# Patient Record
Sex: MALE | Race: WHITE | NOT HISPANIC OR LATINO | Employment: OTHER | URBAN - METROPOLITAN AREA
[De-identification: names, ages, dates, MRNs, and addresses within clinical notes are randomized per-mention and may not be internally consistent; named-entity substitution may affect disease eponyms.]

---

## 2017-07-14 ENCOUNTER — AMBULATORY - HEALTHEAST (OUTPATIENT)
Dept: MULTI SPECIALTY CLINIC | Facility: CLINIC | Age: 62
End: 2017-07-14

## 2017-10-11 ENCOUNTER — RECORDS - HEALTHEAST (OUTPATIENT)
Dept: ADMINISTRATIVE | Facility: OTHER | Age: 62
End: 2017-10-11

## 2019-07-22 ENCOUNTER — RECORDS - HEALTHEAST (OUTPATIENT)
Dept: ADMINISTRATIVE | Facility: OTHER | Age: 64
End: 2019-07-22

## 2019-07-22 LAB — RETINOPATHY: POSITIVE

## 2019-07-26 ENCOUNTER — COMMUNICATION - HEALTHEAST (OUTPATIENT)
Dept: FAMILY MEDICINE | Facility: CLINIC | Age: 64
End: 2019-07-26

## 2019-08-16 ENCOUNTER — OFFICE VISIT - HEALTHEAST (OUTPATIENT)
Dept: FAMILY MEDICINE | Facility: CLINIC | Age: 64
End: 2019-08-16

## 2019-08-16 ENCOUNTER — COMMUNICATION - HEALTHEAST (OUTPATIENT)
Dept: TELEHEALTH | Facility: CLINIC | Age: 64
End: 2019-08-16

## 2019-08-16 DIAGNOSIS — E11.8 TYPE 2 DIABETES MELLITUS WITH COMPLICATION, WITH LONG-TERM CURRENT USE OF INSULIN (H): ICD-10-CM

## 2019-08-16 DIAGNOSIS — E55.9 VITAMIN D DEFICIENCY: ICD-10-CM

## 2019-08-16 DIAGNOSIS — D63.8 ANEMIA OF CHRONIC DISEASE: ICD-10-CM

## 2019-08-16 DIAGNOSIS — Z80.42 FAMILY HISTORY OF PROSTATE CANCER: ICD-10-CM

## 2019-08-16 DIAGNOSIS — Z79.4 TYPE 2 DIABETES MELLITUS WITH COMPLICATION, WITH LONG-TERM CURRENT USE OF INSULIN (H): ICD-10-CM

## 2019-08-16 DIAGNOSIS — E78.5 HYPERLIPIDEMIA WITH TARGET LDL LESS THAN 100: ICD-10-CM

## 2019-08-16 DIAGNOSIS — D72.821 MONOCYTOSIS: ICD-10-CM

## 2019-08-16 DIAGNOSIS — Z00.00 ROUTINE GENERAL MEDICAL EXAMINATION AT A HEALTH CARE FACILITY: ICD-10-CM

## 2019-08-16 DIAGNOSIS — R80.9 MICROALBUMINURIA: ICD-10-CM

## 2019-08-16 DIAGNOSIS — E11.42 DIABETIC POLYNEUROPATHY ASSOCIATED WITH TYPE 2 DIABETES MELLITUS (H): ICD-10-CM

## 2019-08-16 DIAGNOSIS — I10 HTN (HYPERTENSION), BENIGN: ICD-10-CM

## 2019-08-16 LAB
HIV 1+2 AB+HIV1 P24 AG SERPL QL IA: NEGATIVE
PSA SERPL-MCNC: 2.1 NG/ML (ref 0–4.5)

## 2019-08-16 RX ORDER — B-COMPLEX WITH VITAMIN C
1 TABLET ORAL DAILY
Status: SHIPPED | COMMUNITY
Start: 2019-08-16

## 2019-08-16 ASSESSMENT — MIFFLIN-ST. JEOR: SCORE: 2141.63

## 2019-08-16 NOTE — ASSESSMENT & PLAN NOTE
Most recent labs were in June 2019 and show good control of his cholesterol.  Liver panel also normal at that time.  Continue atorvastatin 10 mg daily.

## 2019-08-16 NOTE — ASSESSMENT & PLAN NOTE
The patient had previous work-up by hematology and reactive monocytosis was suspected.  He had a CBC in June 2018 which was normal.  We will repeat testing of light chains today.  If increasing, reevaluation by hematology.

## 2019-08-16 NOTE — ASSESSMENT & PLAN NOTE
The patient reports good control at home.  Systolic is slightly elevated today but he did not take his medications yet.  Continue amlodipine 10 mg daily, metoprolol 50 mg daily and valsartan thiazide 320/25 mg daily.

## 2019-08-16 NOTE — ASSESSMENT & PLAN NOTE
Most recent A1c was 9.4 in June 2019.  The patient unfortunately does not have his sugar log with him today but reports checking 4 times a day.  He also reports excellent compliance with his medications.  Increase Lantus to 30 units each morning and 40 units at bedtime.  Continue current dosing of lispro.  I have asked him to bring his log to his next appointment in 1 month.  We will plan to check an A1c at that time as well.

## 2019-08-19 ENCOUNTER — COMMUNICATION - HEALTHEAST (OUTPATIENT)
Dept: FAMILY MEDICINE | Facility: CLINIC | Age: 64
End: 2019-08-19

## 2019-08-19 DIAGNOSIS — D72.821 MONOCYTOSIS: ICD-10-CM

## 2019-08-19 LAB
25(OH)D3 SERPL-MCNC: 32.3 NG/ML (ref 30–80)
KAPPA LC FREE SER-MCNC: 2.71 MG/DL (ref 0.33–1.94)
KAPPA LC FREE/LAMBDA FREE SER NEPH: 1.32 {RATIO} (ref 0.26–1.65)
LAMBDA LC FREE SERPL-MCNC: 2.06 MG/DL (ref 0.57–2.63)

## 2019-08-22 ENCOUNTER — COMMUNICATION - HEALTHEAST (OUTPATIENT)
Dept: FAMILY MEDICINE | Facility: CLINIC | Age: 64
End: 2019-08-22

## 2019-08-22 DIAGNOSIS — E11.8 TYPE 2 DIABETES MELLITUS WITH COMPLICATION, WITH LONG-TERM CURRENT USE OF INSULIN (H): ICD-10-CM

## 2019-08-22 DIAGNOSIS — Z79.4 TYPE 2 DIABETES MELLITUS WITH COMPLICATION, WITH LONG-TERM CURRENT USE OF INSULIN (H): ICD-10-CM

## 2019-08-27 ENCOUNTER — COMMUNICATION - HEALTHEAST (OUTPATIENT)
Dept: ADMINISTRATIVE | Facility: HOSPITAL | Age: 64
End: 2019-08-27

## 2019-08-29 ENCOUNTER — COMMUNICATION - HEALTHEAST (OUTPATIENT)
Dept: ONCOLOGY | Facility: HOSPITAL | Age: 64
End: 2019-08-29

## 2019-09-19 ENCOUNTER — OFFICE VISIT - HEALTHEAST (OUTPATIENT)
Dept: ONCOLOGY | Facility: HOSPITAL | Age: 64
End: 2019-09-19

## 2019-09-19 DIAGNOSIS — D72.821 MONOCYTOSIS: ICD-10-CM

## 2019-09-19 ASSESSMENT — MIFFLIN-ST. JEOR: SCORE: 2161.14

## 2019-09-27 ENCOUNTER — OFFICE VISIT - HEALTHEAST (OUTPATIENT)
Dept: FAMILY MEDICINE | Facility: CLINIC | Age: 64
End: 2019-09-27

## 2019-09-27 DIAGNOSIS — E11.8 TYPE 2 DIABETES MELLITUS WITH COMPLICATION, WITH LONG-TERM CURRENT USE OF INSULIN (H): ICD-10-CM

## 2019-09-27 DIAGNOSIS — Z79.4 TYPE 2 DIABETES MELLITUS WITH COMPLICATION, WITH LONG-TERM CURRENT USE OF INSULIN (H): ICD-10-CM

## 2019-09-27 LAB — HBA1C MFR BLD: 9.6 % (ref 3.5–6)

## 2019-09-27 NOTE — ASSESSMENT & PLAN NOTE
Not under adequate control.  Increase Lantus to 30 units in the morning and 50 units at bedtime.  Continue mealtime insulin with 1 unit per 3 g of carbohydrates plus correction for blood sugar at each meal.  Continue metformin.  I am recommending that the patient see the [facility] pharmacist to discuss addition of other medications.  Referral placed.  He was also asked to contact me in 2 weeks via my chart with his blood sugar so we can adjust his insulin.

## 2019-10-04 ENCOUNTER — OFFICE VISIT - HEALTHEAST (OUTPATIENT)
Dept: PHARMACY | Facility: CLINIC | Age: 64
End: 2019-10-04

## 2019-10-04 DIAGNOSIS — E11.8 TYPE 2 DIABETES MELLITUS WITH COMPLICATION, WITH LONG-TERM CURRENT USE OF INSULIN (H): ICD-10-CM

## 2019-10-04 DIAGNOSIS — I10 HTN (HYPERTENSION), BENIGN: ICD-10-CM

## 2019-10-04 DIAGNOSIS — Z79.4 TYPE 2 DIABETES MELLITUS WITH COMPLICATION, WITH LONG-TERM CURRENT USE OF INSULIN (H): ICD-10-CM

## 2019-10-04 DIAGNOSIS — E55.9 VITAMIN D DEFICIENCY: ICD-10-CM

## 2019-10-04 DIAGNOSIS — E78.5 HYPERLIPIDEMIA WITH TARGET LDL LESS THAN 100: ICD-10-CM

## 2019-10-25 ENCOUNTER — COMMUNICATION - HEALTHEAST (OUTPATIENT)
Dept: NURSING | Facility: CLINIC | Age: 64
End: 2019-10-25

## 2019-11-12 ENCOUNTER — COMMUNICATION - HEALTHEAST (OUTPATIENT)
Dept: FAMILY MEDICINE | Facility: CLINIC | Age: 64
End: 2019-11-12

## 2019-11-15 ENCOUNTER — OFFICE VISIT - HEALTHEAST (OUTPATIENT)
Dept: FAMILY MEDICINE | Facility: CLINIC | Age: 64
End: 2019-11-15

## 2019-11-15 DIAGNOSIS — I10 HTN (HYPERTENSION), BENIGN: ICD-10-CM

## 2019-11-15 DIAGNOSIS — E11.8 TYPE 2 DIABETES MELLITUS WITH COMPLICATION, WITH LONG-TERM CURRENT USE OF INSULIN (H): ICD-10-CM

## 2019-11-15 DIAGNOSIS — Z79.4 TYPE 2 DIABETES MELLITUS WITH COMPLICATION, WITH LONG-TERM CURRENT USE OF INSULIN (H): ICD-10-CM

## 2019-11-15 LAB
ANION GAP SERPL CALCULATED.3IONS-SCNC: 12 MMOL/L (ref 5–18)
BUN SERPL-MCNC: 17 MG/DL (ref 8–22)
CALCIUM SERPL-MCNC: 9.4 MG/DL (ref 8.5–10.5)
CHLORIDE BLD-SCNC: 103 MMOL/L (ref 98–107)
CO2 SERPL-SCNC: 26 MMOL/L (ref 22–31)
CREAT SERPL-MCNC: 1.02 MG/DL (ref 0.7–1.3)
GFR SERPL CREATININE-BSD FRML MDRD: >60 ML/MIN/1.73M2
GLUCOSE BLD-MCNC: 151 MG/DL (ref 70–125)
POTASSIUM BLD-SCNC: 4.5 MMOL/L (ref 3.5–5)
SODIUM SERPL-SCNC: 141 MMOL/L (ref 136–145)

## 2019-11-15 NOTE — ASSESSMENT & PLAN NOTE
The patient started on jardiance a couple of weeks ago and that is going well. Blood sugars are improving on the new medication. He hashad intermittent low blood sugars before his evening meal so he is elliminating lunch time insulin if BS is not high and he is having limited carbs. No other change in his medication at this time. BMP today. Follow up with A1C and office visit in about 6 weeks.

## 2019-11-19 ENCOUNTER — COMMUNICATION - HEALTHEAST (OUTPATIENT)
Dept: FAMILY MEDICINE | Facility: CLINIC | Age: 64
End: 2019-11-19

## 2019-11-19 DIAGNOSIS — Z79.4 TYPE 2 DIABETES MELLITUS WITH COMPLICATION, WITH LONG-TERM CURRENT USE OF INSULIN (H): ICD-10-CM

## 2019-11-19 DIAGNOSIS — E11.8 TYPE 2 DIABETES MELLITUS WITH COMPLICATION, WITH LONG-TERM CURRENT USE OF INSULIN (H): ICD-10-CM

## 2019-11-26 ENCOUNTER — COMMUNICATION - HEALTHEAST (OUTPATIENT)
Dept: FAMILY MEDICINE | Facility: CLINIC | Age: 64
End: 2019-11-26

## 2019-11-26 DIAGNOSIS — Z79.4 TYPE 2 DIABETES MELLITUS WITH COMPLICATION, WITH LONG-TERM CURRENT USE OF INSULIN (H): ICD-10-CM

## 2019-11-26 DIAGNOSIS — E11.8 TYPE 2 DIABETES MELLITUS WITH COMPLICATION, WITH LONG-TERM CURRENT USE OF INSULIN (H): ICD-10-CM

## 2019-12-03 ENCOUNTER — RECORDS - HEALTHEAST (OUTPATIENT)
Dept: HEALTH INFORMATION MANAGEMENT | Facility: CLINIC | Age: 64
End: 2019-12-03

## 2019-12-27 ENCOUNTER — OFFICE VISIT - HEALTHEAST (OUTPATIENT)
Dept: FAMILY MEDICINE | Facility: CLINIC | Age: 64
End: 2019-12-27

## 2019-12-27 DIAGNOSIS — E11.8 TYPE 2 DIABETES MELLITUS WITH COMPLICATION, WITH LONG-TERM CURRENT USE OF INSULIN (H): ICD-10-CM

## 2019-12-27 DIAGNOSIS — Z23 NEED FOR VACCINATION: ICD-10-CM

## 2019-12-27 DIAGNOSIS — E78.5 HYPERLIPIDEMIA WITH TARGET LDL LESS THAN 100: ICD-10-CM

## 2019-12-27 DIAGNOSIS — Z79.4 TYPE 2 DIABETES MELLITUS WITH COMPLICATION, WITH LONG-TERM CURRENT USE OF INSULIN (H): ICD-10-CM

## 2019-12-27 DIAGNOSIS — I10 HTN (HYPERTENSION), BENIGN: ICD-10-CM

## 2019-12-27 LAB
ALBUMIN SERPL-MCNC: 4.1 G/DL (ref 3.5–5)
ANION GAP SERPL CALCULATED.3IONS-SCNC: 12 MMOL/L (ref 5–18)
BUN SERPL-MCNC: 15 MG/DL (ref 8–22)
CALCIUM SERPL-MCNC: 10.5 MG/DL (ref 8.5–10.5)
CHLORIDE BLD-SCNC: 99 MMOL/L (ref 98–107)
CHOLEST SERPL-MCNC: 163 MG/DL
CO2 SERPL-SCNC: 29 MMOL/L (ref 22–31)
CREAT SERPL-MCNC: 1.09 MG/DL (ref 0.7–1.3)
FASTING STATUS PATIENT QL REPORTED: YES
GFR SERPL CREATININE-BSD FRML MDRD: >60 ML/MIN/1.73M2
GLUCOSE BLD-MCNC: 180 MG/DL (ref 70–125)
HBA1C MFR BLD: 8.2 % (ref 3.5–6)
HDLC SERPL-MCNC: 43 MG/DL
LDLC SERPL CALC-MCNC: 84 MG/DL
PHOSPHATE SERPL-MCNC: 4.3 MG/DL (ref 2.5–4.5)
POTASSIUM BLD-SCNC: 4.4 MMOL/L (ref 3.5–5)
SODIUM SERPL-SCNC: 140 MMOL/L (ref 136–145)
TRIGL SERPL-MCNC: 181 MG/DL

## 2019-12-27 NOTE — ASSESSMENT & PLAN NOTE
Well controlled. Renal profile today. Continue amlodipine 10 mg daily, Metoprolol 50 mg daily, valasartan 320/25 mg daily.

## 2019-12-27 NOTE — ASSESSMENT & PLAN NOTE
A1C still elevated at 8.2 but improved from 9.6 at previous visit. Since it is primarily his fasting sugars that are high, we will increase his Lantus to 60 units at bedtime and 30 units in the morning. Continue other medications unchanged: Metformin 1000 two times a day, empagliflozin 10 mg daily, and sliding scale Lispro 1 unit per carb plus correction. Recheck in 3 months.

## 2020-03-17 ENCOUNTER — COMMUNICATION - HEALTHEAST (OUTPATIENT)
Dept: FAMILY MEDICINE | Facility: CLINIC | Age: 65
End: 2020-03-17

## 2020-05-10 ENCOUNTER — COMMUNICATION - HEALTHEAST (OUTPATIENT)
Dept: FAMILY MEDICINE | Facility: CLINIC | Age: 65
End: 2020-05-10

## 2020-05-10 DIAGNOSIS — E11.8 TYPE 2 DIABETES MELLITUS WITH COMPLICATION, WITH LONG-TERM CURRENT USE OF INSULIN (H): ICD-10-CM

## 2020-05-10 DIAGNOSIS — Z79.4 TYPE 2 DIABETES MELLITUS WITH COMPLICATION, WITH LONG-TERM CURRENT USE OF INSULIN (H): ICD-10-CM

## 2020-05-18 ENCOUNTER — COMMUNICATION - HEALTHEAST (OUTPATIENT)
Dept: FAMILY MEDICINE | Facility: CLINIC | Age: 65
End: 2020-05-18

## 2020-05-18 DIAGNOSIS — Z79.4 TYPE 2 DIABETES MELLITUS WITH COMPLICATION, WITH LONG-TERM CURRENT USE OF INSULIN (H): ICD-10-CM

## 2020-05-18 DIAGNOSIS — E11.8 TYPE 2 DIABETES MELLITUS WITH COMPLICATION, WITH LONG-TERM CURRENT USE OF INSULIN (H): ICD-10-CM

## 2020-05-27 ENCOUNTER — COMMUNICATION - HEALTHEAST (OUTPATIENT)
Dept: FAMILY MEDICINE | Facility: CLINIC | Age: 65
End: 2020-05-27

## 2020-05-27 DIAGNOSIS — E11.8 TYPE 2 DIABETES MELLITUS WITH COMPLICATION, WITH LONG-TERM CURRENT USE OF INSULIN (H): ICD-10-CM

## 2020-05-27 DIAGNOSIS — Z79.4 TYPE 2 DIABETES MELLITUS WITH COMPLICATION, WITH LONG-TERM CURRENT USE OF INSULIN (H): ICD-10-CM

## 2020-06-26 ENCOUNTER — COMMUNICATION - HEALTHEAST (OUTPATIENT)
Dept: FAMILY MEDICINE | Facility: CLINIC | Age: 65
End: 2020-06-26

## 2020-07-10 ENCOUNTER — OFFICE VISIT - HEALTHEAST (OUTPATIENT)
Dept: FAMILY MEDICINE | Facility: CLINIC | Age: 65
End: 2020-07-10

## 2020-07-10 DIAGNOSIS — I10 ESSENTIAL HYPERTENSION: ICD-10-CM

## 2020-07-10 DIAGNOSIS — Z79.4 TYPE 2 DIABETES MELLITUS WITH COMPLICATION, WITH LONG-TERM CURRENT USE OF INSULIN (H): ICD-10-CM

## 2020-07-10 DIAGNOSIS — E11.8 TYPE 2 DIABETES MELLITUS WITH COMPLICATION, WITH LONG-TERM CURRENT USE OF INSULIN (H): ICD-10-CM

## 2020-07-10 DIAGNOSIS — E11.42 DIABETIC POLYNEUROPATHY ASSOCIATED WITH TYPE 2 DIABETES MELLITUS (H): ICD-10-CM

## 2020-07-10 DIAGNOSIS — L82.1 SEBORRHEIC KERATOSIS: ICD-10-CM

## 2020-07-10 DIAGNOSIS — E78.5 HYPERLIPIDEMIA WITH TARGET LDL LESS THAN 100: ICD-10-CM

## 2020-07-10 LAB
ANION GAP SERPL CALCULATED.3IONS-SCNC: 10 MMOL/L (ref 5–18)
BUN SERPL-MCNC: 16 MG/DL (ref 8–22)
CALCIUM SERPL-MCNC: 9.5 MG/DL (ref 8.5–10.5)
CHLORIDE BLD-SCNC: 102 MMOL/L (ref 98–107)
CO2 SERPL-SCNC: 28 MMOL/L (ref 22–31)
CREAT SERPL-MCNC: 1.05 MG/DL (ref 0.7–1.3)
CREAT UR-MCNC: 89.6 MG/DL
GFR SERPL CREATININE-BSD FRML MDRD: >60 ML/MIN/1.73M2
GLUCOSE BLD-MCNC: 130 MG/DL (ref 70–125)
HBA1C MFR BLD: 7.7 % (ref 3.5–6)
MICROALBUMIN UR-MCNC: 10.83 MG/DL (ref 0–1.99)
MICROALBUMIN/CREAT UR: 120.9 MG/G
POTASSIUM BLD-SCNC: 4.1 MMOL/L (ref 3.5–5)
SODIUM SERPL-SCNC: 140 MMOL/L (ref 136–145)
VIT B12 SERPL-MCNC: >2000 PG/ML (ref 213–816)

## 2020-07-10 ASSESSMENT — MIFFLIN-ST. JEOR: SCORE: 2175.3

## 2020-07-10 NOTE — ASSESSMENT & PLAN NOTE
>>ASSESSMENT AND PLAN FOR DIABETIC POLYNEUROPATHY ASSOCIATED WITH TYPE 2 DIABETES MELLITUS (H) WRITTEN ON 7/10/2020  8:44 AM BY FREEDOM LEYVA    Patient not having any pain at this time. No skin breakdown. He will continue with proper skin care. We will continue to aim for good glucose control.    >>ASSESSMENT AND PLAN FOR TYPE 2 DIABETES MELLITUS WITH COMPLICATION, WITH LONG-TERM CURRENT USE OF INSULIN (H) WRITTEN ON 7/10/2020  8:45 AM BY FREEDOM LEYVA    A1C is 7.7 today which is a significant improvement from previous numbers. Artem is happy with this level of control and I am in agreement with him. Continue current management. Recheck in 6 months.

## 2020-07-10 NOTE — ASSESSMENT & PLAN NOTE
A1C is 7.7 today which is a significant improvement from previous numbers. Artem is happy with this level of control and I am in agreement with him. Continue current management. Recheck in 6 months.

## 2020-07-10 NOTE — ASSESSMENT & PLAN NOTE
Well controlled. Continue amlodipine 10 mg, metoprolol 50 mg, and valasartan-hctz 320-25 daily. BMP today. Recheck in 6 months.

## 2020-07-16 ENCOUNTER — COMMUNICATION - HEALTHEAST (OUTPATIENT)
Dept: FAMILY MEDICINE | Facility: CLINIC | Age: 65
End: 2020-07-16

## 2020-07-16 DIAGNOSIS — Z79.4 TYPE 2 DIABETES MELLITUS WITH COMPLICATION, WITH LONG-TERM CURRENT USE OF INSULIN (H): ICD-10-CM

## 2020-07-16 DIAGNOSIS — E11.8 TYPE 2 DIABETES MELLITUS WITH COMPLICATION, WITH LONG-TERM CURRENT USE OF INSULIN (H): ICD-10-CM

## 2020-09-07 ENCOUNTER — COMMUNICATION - HEALTHEAST (OUTPATIENT)
Dept: FAMILY MEDICINE | Facility: CLINIC | Age: 65
End: 2020-09-07

## 2020-09-07 DIAGNOSIS — E11.8 TYPE 2 DIABETES MELLITUS WITH COMPLICATION, WITH LONG-TERM CURRENT USE OF INSULIN (H): ICD-10-CM

## 2020-09-07 DIAGNOSIS — Z79.4 TYPE 2 DIABETES MELLITUS WITH COMPLICATION, WITH LONG-TERM CURRENT USE OF INSULIN (H): ICD-10-CM

## 2020-09-08 ENCOUNTER — COMMUNICATION - HEALTHEAST (OUTPATIENT)
Dept: FAMILY MEDICINE | Facility: CLINIC | Age: 65
End: 2020-09-08

## 2020-11-23 ENCOUNTER — COMMUNICATION - HEALTHEAST (OUTPATIENT)
Dept: FAMILY MEDICINE | Facility: CLINIC | Age: 65
End: 2020-11-23

## 2020-11-23 DIAGNOSIS — Z79.4 TYPE 2 DIABETES MELLITUS WITH COMPLICATION, WITH LONG-TERM CURRENT USE OF INSULIN (H): ICD-10-CM

## 2020-11-23 DIAGNOSIS — E11.8 TYPE 2 DIABETES MELLITUS WITH COMPLICATION, WITH LONG-TERM CURRENT USE OF INSULIN (H): ICD-10-CM

## 2020-11-24 RX ORDER — ASPIRIN 81 MG/1
TABLET, CHEWABLE ORAL
Qty: 90 TABLET | Refills: 1 | Status: SHIPPED | OUTPATIENT
Start: 2020-11-24 | End: 2022-03-01

## 2020-12-18 ENCOUNTER — OFFICE VISIT - HEALTHEAST (OUTPATIENT)
Dept: FAMILY MEDICINE | Facility: CLINIC | Age: 65
End: 2020-12-18

## 2020-12-18 DIAGNOSIS — E78.5 HYPERLIPIDEMIA WITH TARGET LDL LESS THAN 100: ICD-10-CM

## 2020-12-18 DIAGNOSIS — Z79.4 TYPE 2 DIABETES MELLITUS WITH COMPLICATION, WITH LONG-TERM CURRENT USE OF INSULIN (H): ICD-10-CM

## 2020-12-18 DIAGNOSIS — E11.8 TYPE 2 DIABETES MELLITUS WITH COMPLICATION, WITH LONG-TERM CURRENT USE OF INSULIN (H): ICD-10-CM

## 2020-12-18 DIAGNOSIS — Z12.5 SCREENING PSA (PROSTATE SPECIFIC ANTIGEN): ICD-10-CM

## 2020-12-18 DIAGNOSIS — I10 ESSENTIAL HYPERTENSION: ICD-10-CM

## 2020-12-18 LAB
ALBUMIN SERPL-MCNC: 3.9 G/DL (ref 3.5–5)
ALP SERPL-CCNC: 101 U/L (ref 45–120)
ALT SERPL W P-5'-P-CCNC: 49 U/L (ref 0–45)
ANION GAP SERPL CALCULATED.3IONS-SCNC: 21 MMOL/L (ref 5–18)
AST SERPL W P-5'-P-CCNC: 30 U/L (ref 0–40)
BILIRUB SERPL-MCNC: 0.3 MG/DL (ref 0–1)
BUN SERPL-MCNC: 17 MG/DL (ref 8–22)
CALCIUM SERPL-MCNC: 9.5 MG/DL (ref 8.5–10.5)
CHLORIDE BLD-SCNC: 104 MMOL/L (ref 98–107)
CHOLEST SERPL-MCNC: 154 MG/DL
CO2 SERPL-SCNC: 18 MMOL/L (ref 22–31)
CREAT SERPL-MCNC: 0.98 MG/DL (ref 0.7–1.3)
FASTING STATUS PATIENT QL REPORTED: YES
GFR SERPL CREATININE-BSD FRML MDRD: >60 ML/MIN/1.73M2
GLUCOSE BLD-MCNC: 144 MG/DL (ref 70–125)
HBA1C MFR BLD: 7.8 %
HDLC SERPL-MCNC: 43 MG/DL
LDLC SERPL CALC-MCNC: 83 MG/DL
POTASSIUM BLD-SCNC: 4.3 MMOL/L (ref 3.5–5)
PROT SERPL-MCNC: 7.5 G/DL (ref 6–8)
PSA SERPL-MCNC: 0.7 NG/ML (ref 0–4.5)
SODIUM SERPL-SCNC: 143 MMOL/L (ref 136–145)
TRIGL SERPL-MCNC: 138 MG/DL

## 2020-12-18 ASSESSMENT — MIFFLIN-ST. JEOR: SCORE: 2142.81

## 2020-12-18 NOTE — ASSESSMENT & PLAN NOTE
Fairly good control with an A1C of 7.8. We discussed an increase in insulin but the patient would prefer to work on adding in some exercise. Continue current dosing of medications. Recheck in 3 months.

## 2021-02-03 ENCOUNTER — COMMUNICATION - HEALTHEAST (OUTPATIENT)
Dept: FAMILY MEDICINE | Facility: CLINIC | Age: 66
End: 2021-02-03

## 2021-02-04 ENCOUNTER — COMMUNICATION - HEALTHEAST (OUTPATIENT)
Dept: FAMILY MEDICINE | Facility: CLINIC | Age: 66
End: 2021-02-04

## 2021-04-02 ENCOUNTER — OFFICE VISIT - HEALTHEAST (OUTPATIENT)
Dept: FAMILY MEDICINE | Facility: CLINIC | Age: 66
End: 2021-04-02

## 2021-04-02 DIAGNOSIS — E11.8 TYPE 2 DIABETES MELLITUS WITH COMPLICATION, WITH LONG-TERM CURRENT USE OF INSULIN (H): ICD-10-CM

## 2021-04-02 DIAGNOSIS — E11.42 DIABETIC POLYNEUROPATHY ASSOCIATED WITH TYPE 2 DIABETES MELLITUS (H): ICD-10-CM

## 2021-04-02 DIAGNOSIS — Z79.4 TYPE 2 DIABETES MELLITUS WITH COMPLICATION, WITH LONG-TERM CURRENT USE OF INSULIN (H): ICD-10-CM

## 2021-04-02 DIAGNOSIS — E66.01 MORBID OBESITY (H): ICD-10-CM

## 2021-04-02 DIAGNOSIS — E78.5 HYPERLIPIDEMIA WITH TARGET LDL LESS THAN 100: ICD-10-CM

## 2021-04-02 DIAGNOSIS — Z00.00 ROUTINE GENERAL MEDICAL EXAMINATION AT A HEALTH CARE FACILITY: ICD-10-CM

## 2021-04-02 DIAGNOSIS — I10 ESSENTIAL HYPERTENSION: ICD-10-CM

## 2021-04-02 DIAGNOSIS — L98.9 SKIN LESION: ICD-10-CM

## 2021-04-02 LAB — HBA1C MFR BLD: 7.2 %

## 2021-04-02 RX ORDER — METOPROLOL SUCCINATE 50 MG/1
50 TABLET, EXTENDED RELEASE ORAL DAILY
Qty: 90 TABLET | Refills: 3 | Status: SHIPPED | OUTPATIENT
Start: 2021-04-02 | End: 2022-01-20

## 2021-04-02 RX ORDER — FLURBIPROFEN SODIUM 0.3 MG/ML
SOLUTION/ DROPS OPHTHALMIC
Qty: 450 EACH | Refills: 3 | Status: SHIPPED | OUTPATIENT
Start: 2021-04-02 | End: 2022-03-01

## 2021-04-02 RX ORDER — LANCETS 30 GAUGE
EACH MISCELLANEOUS
Qty: 300 EACH | Refills: 3 | Status: SHIPPED | OUTPATIENT
Start: 2021-04-02 | End: 2024-01-31

## 2021-04-02 RX ORDER — EMPAGLIFLOZIN 10 MG/1
10 TABLET, FILM COATED ORAL DAILY
Qty: 90 TABLET | Refills: 3 | Status: SHIPPED | OUTPATIENT
Start: 2021-04-02 | End: 2022-01-20

## 2021-04-02 RX ORDER — AMLODIPINE BESYLATE 10 MG/1
10 TABLET ORAL DAILY
Qty: 90 TABLET | Refills: 3 | Status: SHIPPED | OUTPATIENT
Start: 2021-04-02 | End: 2022-01-20

## 2021-04-02 RX ORDER — ATORVASTATIN CALCIUM 10 MG/1
10 TABLET, FILM COATED ORAL DAILY
Qty: 90 TABLET | Refills: 3 | Status: SHIPPED | OUTPATIENT
Start: 2021-04-02 | End: 2022-01-20

## 2021-04-02 ASSESSMENT — MIFFLIN-ST. JEOR: SCORE: 2123.14

## 2021-04-02 NOTE — ASSESSMENT & PLAN NOTE
>>ASSESSMENT AND PLAN FOR DIABETIC POLYNEUROPATHY ASSOCIATED WITH TYPE 2 DIABETES MELLITUS (H) WRITTEN ON 4/2/2021  8:59 AM BY FREEDOM LEYVA    Daily foot exams recommended. Other than lesion mentioned separately, skin is in excellent condition.    >>ASSESSMENT AND PLAN FOR TYPE 2 DIABETES MELLITUS WITH COMPLICATION, WITH LONG-TERM CURRENT USE OF INSULIN (H) WRITTEN ON 4/2/2021  8:54 AM BY FREEDOM LEYVA    Control sounds better according to home monitoring. A1C today. Patient is working hard on regular exercise. No regular low sugars but he will continue to monitor as he increases exercise.

## 2021-04-02 NOTE — ASSESSMENT & PLAN NOTE
Dark lesion on the dorsum of his right 4th toe. This is most consistent with a blood blister and doescoincide with an increase in walking. He is going to apply lotion and monitor daily. Referral placed to dermatology for check if not improving in 6 weeks.

## 2021-04-02 NOTE — ASSESSMENT & PLAN NOTE
Control sounds better according to home monitoring. A1C today. Patient is working hard on regular exercise. No regular low sugars but he will continue to monitor as he increases exercise.

## 2021-04-05 ENCOUNTER — RECORDS - HEALTHEAST (OUTPATIENT)
Dept: ADMINISTRATIVE | Facility: OTHER | Age: 66
End: 2021-04-05

## 2021-04-06 ENCOUNTER — COMMUNICATION - HEALTHEAST (OUTPATIENT)
Dept: FAMILY MEDICINE | Facility: CLINIC | Age: 66
End: 2021-04-06

## 2021-04-06 DIAGNOSIS — I10 ESSENTIAL HYPERTENSION: ICD-10-CM

## 2021-04-06 DIAGNOSIS — E11.8 TYPE 2 DIABETES MELLITUS WITH COMPLICATION, WITH LONG-TERM CURRENT USE OF INSULIN (H): ICD-10-CM

## 2021-04-06 DIAGNOSIS — Z79.4 TYPE 2 DIABETES MELLITUS WITH COMPLICATION, WITH LONG-TERM CURRENT USE OF INSULIN (H): ICD-10-CM

## 2021-04-06 RX ORDER — VALSARTAN AND HYDROCHLOROTHIAZIDE 320; 25 MG/1; MG/1
1 TABLET, FILM COATED ORAL DAILY
Qty: 90 TABLET | Refills: 3 | Status: SHIPPED | OUTPATIENT
Start: 2021-04-06 | End: 2022-01-20

## 2021-04-08 ENCOUNTER — COMMUNICATION - HEALTHEAST (OUTPATIENT)
Dept: FAMILY MEDICINE | Facility: CLINIC | Age: 66
End: 2021-04-08

## 2021-04-08 DIAGNOSIS — E11.8 TYPE 2 DIABETES MELLITUS WITH COMPLICATION, WITH LONG-TERM CURRENT USE OF INSULIN (H): ICD-10-CM

## 2021-04-08 DIAGNOSIS — Z79.4 TYPE 2 DIABETES MELLITUS WITH COMPLICATION, WITH LONG-TERM CURRENT USE OF INSULIN (H): ICD-10-CM

## 2021-05-26 VITALS — HEART RATE: 80 BPM | DIASTOLIC BLOOD PRESSURE: 72 MMHG | SYSTOLIC BLOOD PRESSURE: 142 MMHG

## 2021-05-30 NOTE — TELEPHONE ENCOUNTER
New Appointment Needed  What is the reason for the visit:    Shingrix Shot - 1st Dose  Provider Preference: Any available  How soon do you need to be seen?: 8/16/19, patient has scheduled appointment on this day  Waitlist offered?: No  Okay to leave a detailed message:  Yes

## 2021-05-30 NOTE — TELEPHONE ENCOUNTER
Request for shingles added to appt notes, shingles vaccine set aside for patient.  Voice mail left for pt

## 2021-05-31 NOTE — TELEPHONE ENCOUNTER
WQ order received for Hematology Consult, referring physician Fuad Klein DO. Spoke with the patient to set up appointment. Same scheduled for Thursday, September 19, 2019 at 9:00 am with Dr. Smart with a Nurse appointment at 8:45 am.  Packet sent via email to pt with informational letter, MD bio card, [facility] intake form, medication and allergy list, and appointment letter. Work up for Monocytosis has been done at [facility] and .   Medical records will collect any outside records and ensure all records are available at the time of consultation.    I explained that the pt. would be coming to a Cancer Center and that the doctors are Oncologists as well as Hematologists. Explained the appointment process of arriving early and bringing his Health History and medication allergy list along to his appointment.   My phone number was given to the pt. for any question or concerns - [phone].

## 2021-05-31 NOTE — TELEPHONE ENCOUNTER
New rx prepped for 90 days supply of pen needles as that is what is required for mail order pharmacy  Please send when able

## 2021-05-31 NOTE — TELEPHONE ENCOUNTER
"Medication Question or Clarification  Who is calling: [facility]  What medication are you calling about? (include dose and sig)   BD ULTRA-FINE MINI PEN NEEDLE 31 gauge x 3/16\" Ndle 100 each 11 8/16/2019     Sig: Check blood sugars qid    Sent to pharmacy as: BD ULTRA-FINE MINI PEN NEEDLE 31 gauge x 3/16\" Ndle        Who prescribed the medication?: Reina Seaman MD    What is your question/concern?: Pharmacy is asking to clarify as patient is requesting to test 5 times a day and we need this to be sent as a 90 day supply   Pharmacy: [facility]  Okay to leave a detailed message?: Yes  Site CMT - Please call the pharmacy to obtain any additional needed information.  "

## 2021-05-31 NOTE — TELEPHONE ENCOUNTER
Dr. Rene is out of office.  As such I was reviewing her lab results.  The kappa light chain test did show some mild elevation.  As per Dr. Rene's plan, I do recommend a referral back to hematology for further evaluation and treatment.  Consult was placed.    I left message to patient concerning the same.    Respectfully,  Joe Klein DO

## 2021-05-31 NOTE — PATIENT INSTRUCTIONS - HE
Increase evening dose of glargine insulin to 40 units.  Continue 30 units in the morning.  Continue current dosing of Lantus.  No change in your other medications.  I would like to see you back for recheck of your diabetes in 1 month.  Please bring a copy of your blood sugar logs at that time.

## 2021-05-31 NOTE — PROGRESS NOTES
MALE PREVENTATIVE EXAM    Assessment and Plan:     Problem List Items Addressed This Visit     Anemia of chronic disease    Diabetic polyneuropathy associated with type 2 diabetes mellitus (H)    Relevant Medications    insulin lispro 100 unit/mL inph    insulin glargine (LANTUS; BASAGLAR) 100 unit/mL (3 mL) pen    insulin lispro (HUMALOG KWIKPEN) 100 unit/mL pen    HTN (hypertension), benign     The patient reports good control at home.  Systolic is slightly elevated today but he did not take his medications yet.  Continue amlodipine 10 mg daily, metoprolol 50 mg daily and valsartan thiazide 320/25 mg daily.         Relevant Medications    amLODIPine (NORVASC) 10 MG tablet    metoprolol succinate (TOPROL-XL) 50 MG 24 hr tablet    valsartan-hydrochlorothiazide (DIOVAN-HCT) 320-25 mg per tablet    Hyperlipidemia with target LDL less than 100     Most recent labs were in June 2019 and show good control of his cholesterol.  Liver panel also normal at that time.  Continue atorvastatin 10 mg daily.         Relevant Medications    atorvastatin (LIPITOR) 10 MG tablet    Microalbuminuria     Continue to aim for improved control of his blood sugars as well as blood pressure.         Monocytosis     The patient had previous work-up by hematology and reactive monocytosis was suspected.  He had a CBC in June 2018 which was normal.  We will repeat testing of light chains today.  If increasing, reevaluation by hematology.         Relevant Orders    Immunoglobulin Free Light Chains, Serum    Type 2 diabetes mellitus with complication, with long-term current use of insulin (H)     Most recent A1c was 9.4 in June 2019.  The patient unfortunately does not have his sugar log with him today but reports checking 4 times a day.  He also reports excellent compliance with his medications.  Increase Lantus to 30 units each morning and 40 units at bedtime.  Continue current dosing of lispro.  I have asked him to bring his log to his next  "appointment in 1 month.  We will plan to check an A1c at that time as well.         Relevant Medications    insulin lispro 100 unit/mL inph    aspirin 81 mg chewable tablet    BD ULTRA-FINE MINI PEN NEEDLE 31 gauge x 3/16\" Ndle    insulin glargine (LANTUS; BASAGLAR) 100 unit/mL (3 mL) pen    insulin lispro (HUMALOG KWIKPEN) 100 unit/mL pen    Vitamin D deficiency     Vitamin D level today.         Relevant Orders    Vitamin D, Total (25-Hydroxy)      Other Visit Diagnoses     Routine general medical examination at a health care facility    -  Primary    Relevant Orders    HIV Antigen/Antibody Screening Pope    Family history of prostate cancer        Relevant Orders    PSA, Annual Screen (Prostatic-Specific Antigen)        Patient Instructions   Increase evening dose of glargine insulin to 40 units.  Continue 30 units in the morning.  Continue current dosing of Lantus.  No change in your other medications.  I would like to see you back for recheck of your diabetes in 1 month.  Please bring a copy of your blood sugar logs at that time.     Subjective:   HPI: Artem Pryor is an 64 y.o. male here for a preventative health visit.     He is also needing recheck of his diabetes. He is checking his blood sugars 3-4 times per day. His morning numbers have been about 260. In the evening his numbers are around 120-140 prior to his evening meals. He taking lantus 30 units twice daily. He takes Lispro 20 in the morning, 10 at lunch, and 25 at dinner. He does count his carbohydrates. He denies missing doses. He doesn't have his log book with him today. He isn't having any low blood sugars.    He also needs recheck on his blood pressure. He is taking amlodipine and metoprolol. He hasn't taken his medication yet today. He reports his blood pressure is usually running 130/70.    Do you feel you are safe where you are living?: Yes (8/16/2019  8:16 AM)  Do you feel you are safe in your relationship(s)?: Yes (8/16/2019  8:16 " AM)      Patient Active Problem List   Diagnosis     Type 2 diabetes mellitus with complication, with long-term current use of insulin (H)     HTN (hypertension), benign     Hyperlipidemia with target LDL less than 100     Microalbuminuria     Vitamin D deficiency     Anemia of chronic disease     Diabetic polyneuropathy associated with type 2 diabetes mellitus (H)     Monocytosis      Past Medical History:   Diagnosis Date     Lyme disease       History reviewed. No pertinent surgical history.   Family History   Problem Relation Age of Onset     Prostate cancer Father       Social History     Tobacco Use     Smoking status: Never Smoker     Smokeless tobacco: Never Used   Substance Use Topics     Alcohol use: Yes     Comment: A few times a year      Review of Systems   Constitutional: Negative for activity change, appetite change, fever and unexpected weight change.   HENT: Negative for congestion, hearing loss and sore throat.    Eyes: Negative for discharge and visual disturbance.   Respiratory: Negative for cough, shortness of breath and wheezing.    Cardiovascular: Negative for chest pain, palpitations and leg swelling.   Gastrointestinal: Negative for abdominal pain, blood in stool, constipation, diarrhea and vomiting.   Endocrine: Negative for polydipsia and polyuria.   Genitourinary: Negative for decreased urine volume, difficulty urinating, dysuria, frequency, hematuria and urgency.   Musculoskeletal: Negative for arthralgias, gait problem and myalgias.   Skin: Negative for rash.   Allergic/Immunologic: Negative for immunocompromised state.   Neurological: Negative for weakness.   Hematological: Does not bruise/bleed easily.   Psychiatric/Behavioral: Negative for dysphoric mood and sleep disturbance. The patient is not nervous/anxious.        Objective:   Vital Signs:   /72 (Patient Site: Left Arm, Patient Position: Sitting, Cuff Size: Adult Regular)   Pulse 67   Ht 6' (1.829 m)   Wt (!) 288 lb 8  oz (130.9 kg)   SpO2 98%   BMI 39.13 kg/m       PHYSICAL EXAM  Physical Exam   Constitutional: He is oriented to person, place, and time. He appears well-developed and well-nourished. No distress.   HENT:   Right Ear: Tympanic membrane and ear canal normal.   Left Ear: Tympanic membrane and ear canal normal.   Mouth/Throat: Oropharynx is clear and moist and mucous membranes are normal.   Eyes: Pupils are equal, round, and reactive to light. Conjunctivae and EOM are normal.   Neck: Normal range of motion. Neck supple. Carotid bruit is not present. No thyromegaly present.   Cardiovascular: Normal rate and regular rhythm.   No murmur heard.  Pulses:       Dorsalis pedis pulses are 2+ on the right side, and 2+ on the left side.        Posterior tibial pulses are 2+ on the right side, and 2+ on the left side.   Pulmonary/Chest: Effort normal and breath sounds normal. No respiratory distress. He has no wheezes. He has no rales.   Abdominal: Soft. Bowel sounds are normal. He exhibits no distension and no mass. There is no hepatosplenomegaly. There is no tenderness. Hernia confirmed negative in the ventral area.   Musculoskeletal: He exhibits no edema.   Feet:   Right Foot:   Protective Sensation: 10 sites tested. 10 sites sensed.   Skin Integrity: Positive for dry skin. Negative for ulcer or skin breakdown.   Left Foot:   Protective Sensation: 10 sites tested. 10 sites sensed.   Skin Integrity: Positive for dry skin. Negative for ulcer or skin breakdown.   Lymphadenopathy:     He has no cervical adenopathy.   Neurological: He is alert and oriented to person, place, and time. No cranial nerve deficit.   Skin: No rash noted.   Psychiatric: He has a normal mood and affect. Judgment normal.   Vitals reviewed.

## 2021-05-31 NOTE — TELEPHONE ENCOUNTER
Please clarify with [facility].  It seems appropriate that the patient is testing 5 times a day but the refill request is for pen needles.  Does the patient need a prescription for test strips sent in with clarification?  Or a prescription for a 90-day supply of pen needles?

## 2021-06-01 NOTE — CONSULTS
[facility] Hematology and Oncology Consult Note    Patient: Artem Pryor  MRN: [id number]  Date of Service: 09/19/2019        Reason for Visit    I was consulted by Dr. Klein regarding Monocytosis    Assessment/Plan    Mild monocytosis, probably reactive  Mild increase in kappa light chains    Previous work-up including review of peripheral smear did not show any abnormalities.  His monocyte count peaked at 1.8 almost 2 years ago.  WBC count is normal, platelet count is normal, very mild anemia noted.  Therefore no major concern for a bone marrow disorder at this time.    Patient overall reports improvement in all of his neurologic symptoms which were attributed to Lyme's disease.    At this point the mild monocytosis could be reactive or just normal for this patient.  Given the lack of any progression in the monocytosis and lack of any other findings, I would recommend no additional work-up and just observation.    He is noted with mild elevation in the kappa light chains.  However there is no evidence of a monoclonal gammopathy.  And his kappa lambda ratio was normal which is reassuring.  No concern for a plasma cell dyscrasia at this time.    Reasonable to recheck CBC every 6 months to a year to see if he is having any progressive monocytosis or worsening anemia or thrombocytopenia in which case we can see him back for additional work-up.    Discussed with patient and answered his questions.  He is comfortable with the plan.    Plan: No additional work-up  No follow-up in hematology  Should have CBC monitored every 6 months to year to look for changes as described above        ECOG Performance   ECOG Performance Status: 0  Distress Assessment  Distress Assessment Score: No distress        Problem List    1. Monocytosis             CC: Reina Seaman MD      ______________________________________________________________________________      Staging History    Cancer Staging  No matching staging information  was found for the patient.    History    Mr. Artem Pryor is a very pleasant 64-year-old white male with past history of diabetes, hypertension and hyperlipidemia with microalbuminuria who was referred back for monocytosis.  He was diagnosed with Lyme's disease about a year and a half ago when he had symptoms of excessive fatigue, left facial droopiness and also left arm weakness.  He was worked up and felt to have Lyme's disease.  Was followed with a neurologist.  Reports that he was on antibiotics for about 13 months and completed this in February 2019.  At that time he was also noted to have monocytosis.  He was seen and evaluated by hematology at [facility] and their work-up was essentially negative.  Peripheral smear was normal.  No evidence of a monoclonal gammopathy was noted.    Since that time patient reports improvement overall in his symptoms.  Neurologic symptoms have resolved.  He denies any weight loss.  No new cardiac issues.  No numbness or tingling.  No problems with diarrhea.    Past medical history as above.  He has had laser eye surgery.    Works as an .   but no kids.    Smoked briefly for 3 to 4 years but quit in 1978.  Rarely drinks alcohol.    Past History  Past Medical History:   Diagnosis Date     Diabetes mellitus (H)      Hypertension      Lyme disease      History reviewed. No pertinent surgical history.  Family History   Problem Relation Age of Onset     Breast cancer Mother 70     Prostate cancer Father 70     Melanoma Father 70     Colon cancer Maternal Grandfather 70     Colon cancer Maternal Uncle 85     Thyroid cancer Maternal cousin 25     Social History     Socioeconomic History     Marital status:      Spouse name: None     Number of children: None     Years of education: None     Highest education level: None   Occupational History     None   Social Needs     Financial resource strain: None     Food insecurity:     Worry: None     Inability: None      Transportation needs:     Medical: None     Non-medical: None   Tobacco Use     Smoking status: Former Smoker     Smokeless tobacco: Never Used   Substance and Sexual Activity     Alcohol use: Yes     Comment: A few times a year     Drug use: Never     Sexual activity: Never   Lifestyle     Physical activity:     Days per week: None     Minutes per session: None     Stress: None   Relationships     Social connections:     Talks on phone: None     Gets together: None     Attends Sikh service: None     Active member of club or organization: None     Attends meetings of clubs or organizations: None     Relationship status: None     Intimate partner violence:     Fear of current or ex partner: None     Emotionally abused: None     Physically abused: None     Forced sexual activity: None   Other Topics Concern     None   Social History Narrative     None     Allergies    Allergies   Allergen Reactions     Lisinopril Cough       Review of Systems    General  General (WDL): Exceptions to WDL  Fatigue: Yes - Chronic (Greater than 3 months)  ENT  ENT (WDL): Exceptions to WDL  Changes in vision: Yes - Chronic (Greater than 3 months)  Glasses or Contacts: Yes - Recent (Less than 3 months)  Hearing loss: Yes - Chronic (Greater than 3 months)  Hearing Aids: Yes - Recent (Less than 3 months)  Sinus Congestion/Drainage: Yes - Recent (Less than 3 months)  Hoarseness: Yes - Recent (Less than 3 months)  Respiratory  Respiratory (WDL): Exceptions to WDL  Cough: Yes - Recent (Less than 3 months)  Cardiovascular  Cardiovascular (WDL): Exceptions to WDL  Lightheadedness: Yes - Recent (Less than 3 months)  Endocrine  Endocrine (WDL): Exceptions to WDL  Heat Intolerance: Yes - Recent (Less than 3 months)  Cold Intolerance: Yes - Recent (Less than 3 months)  Excessive Urination: Yes - Chronic (Greater than 3 months)  Gastrointestinal  Gastrointestinal (WDL): Exceptions to WDL  Heartburn: Yes - Recent (Less than 3 months)  Nausea and  "Vomiting: Yes - Recent (Less than 3 months)  Abdominal Pain: Yes - Recent (Less than 3 months)  Diarrhea: Yes - Recent (Less than 3 months)  Musculoskeletal  Musculoskeletal (WDL): Exceptions to WDL  Joint pain: Yes - Recent (Less than 3 months)  Back Pain: Yes - Recent (Less than 3 months)  Pain interfering with walking: Yes - Recent (Less than 3 months)  Muscle pain or stiffness: Yes - Recent (Less than 3 months)  Neurological  Neurological (WDL): Exceptions to WDL  Headaches: Yes - Recent (Less than 3 months)  Difficulty walking: Yes - Recent (Less than 3 months)  Dominant Hand: Right  Psychological/Emotional  Psychological/Emotional (WDL): Exceptions to WDL  Depression: Yes - Recent (Less than 3 months)  Insomnia: Yes - Recent (Less than 3 months)  Daytime sleepiness: Yes - Recent (Less than 3 months)  Hematological/Lymphatic  Hematological/Lymphatic (WDL): Exceptions to WDL  Epistaxis: Yes - Recent (Less than 3 months)  Dermatological  Dermatologic (WDL): All dermatological elements are within defined limits  Genitourinary/Reproductive  Genitourinary/Reproductive (WDL): Exceptions to WDL  Urinary Frequency: Yes - Chronic (Greater than 3 months)  Urination more than 2 times a night: Yes - Recent (Less than 3 months)  Sensation of incomplete emptying of bladder: Yes - Recent (Less than 3 months)  Reproductive (Females only)     Pain  Currently in Pain: No/denies    Physical Exam    Recent Vitals 9/19/2019   Height 6' 1\"   Weight 289 lbs 5 oz   BSA (m2) 2.6 m2   /84   Pulse 76   Temp 98.8   Temp src 1   SpO2 95       GENERAL: Alert and oriented to time place and person. Seated comfortably. In no distress.    HEAD: Atraumatic and normocephalic.    EYES: SOLANGE, EOMI.  No pallor.  No icterus.    Oral cavity: no mucosal lesion or tonsillar enlargement.    NECK: supple. JVP normal.  No thyroid enlargement.    LYMPH NODES: No palpable, cervical, axillary or inguinal lymphadenopathy.    CHEST: clear to auscultation " bilaterally.  Resonant to percussion throughout bilaterally.  Symmetrical breath movements bilaterally.    CVS: S1 and S2 are heard. Regular rate and rhythm.  No murmur or gallop or rub heard.  No peripheral edema.    ABDOMEN: Soft. Not tender. Not distended.  No palpable hepatomegaly or splenomegaly.  No other mass palpable.  Bowel sounds heard.    EXTREMITIES: Warm.    SKIN: no rash, or bruising or purpura.  Has a full head of hair.      Lab Results    CBC shows normal WBC count, very mild anemia and normal platelet count.  Absolute monocyte count peaked at 1.8 most recently 1.4.    Imaging Results    No results found.      Signed by: Marga Smart MD

## 2021-06-01 NOTE — PROGRESS NOTES
Assessment/Plan:      Problem List Items Addressed This Visit     Type 2 diabetes mellitus with complication, with long-term current use of insulin (H) - Primary     Not under adequate control.  Increase Lantus to 30 units in the morning and 50 units at bedtime.  Continue mealtime insulin with 1 unit per 3 g of carbohydrates plus correction for blood sugar at each meal.  Continue metformin.  I am recommending that the patient see the [facility] pharmacist to discuss addition of other medications.  Referral placed.  He was also asked to contact me in 2 weeks via my chart with his blood sugar so we can adjust his insulin.         Relevant Medications    insulin glargine (LANTUS SOLOSTAR PEN) 100 unit/mL (3 mL) pen    Other Relevant Orders    Glycosylated Hemoglobin A1c (Completed)    Ambulatory referral to Medication Management          Patient Instructions   Increase Lantus insulin to 30 units each morning and 50 units each evening. No change in meal time insulin.  You will get a call to schedule with the pharmacist in regards to diabetes medications.  Please send me a note in my chart in about 2 weeks with your blood sugar numbers so we can adjust your insulin if needed.  Follow-up in 6 weeks.      Return in about 6 weeks (around 11/8/2019) for Diabetes.    Subjective:   Artem Pryor is a 64 y.o. male who presents today for recheck on his diabetes.  He reports excellent compliance with his medications.  His blood sugars continue to run high in the morning, usually between 190 and 250.  He is not having any low blood sugars.  His blood sugars later in the day tend to be more around 160.  He is taking Lantus 30 units in the morning and 40 units at bedtime.  He is taking lispro with his meals dosing 1 units per 3 g carb plus correction for blood glucose.    No other concerns today.  Patient Active Problem List   Diagnosis     Type 2 diabetes mellitus with complication, with long-term current use of insulin (H)     HTN  (hypertension), benign     Hyperlipidemia with target LDL less than 100     Microalbuminuria     Vitamin D deficiency     Anemia of chronic disease     Diabetic polyneuropathy associated with type 2 diabetes mellitus (H)     Monocytosis      Past Medical History:   Diagnosis Date     Diabetes mellitus (H)      Hypertension      Lyme disease      No past surgical history on file.    Review of Systems   Respiratory: Negative for cough, chest tightness, shortness of breath and wheezing.    Cardiovascular: Negative for chest pain, palpitations and leg swelling.         Objective:     Vitals:    09/27/19 0752   BP: 130/78   Pulse: 80   SpO2: 97%   Weight: (!) 291 lb 1.6 oz (132 kg)       Physical Exam  Constitutional:       General: He is not in acute distress.  Cardiovascular:      Rate and Rhythm: Normal rate and regular rhythm.      Heart sounds: No murmur.   Pulmonary:      Effort: Pulmonary effort is normal.      Breath sounds: Normal breath sounds.   Psychiatric:         Mood and Affect: Mood normal.       Results for orders placed or performed in visit on 09/27/19   Glycosylated Hemoglobin A1c   Result Value Ref Range    Hemoglobin A1c 9.6 (H) 3.5 - 6.0 %

## 2021-06-01 NOTE — PATIENT INSTRUCTIONS - HE
Increase Lantus insulin to 30 units each morning and 50 units each evening. No change in meal time insulin.  You will get a call to schedule with the pharmacist in regards to diabetes medications.  Please send me a note in my chart in about 2 weeks with your blood sugar numbers so we can adjust your insulin if needed.  Follow-up in 6 weeks.

## 2021-06-02 NOTE — PROGRESS NOTES
MTM Initial Encounter  Assessment & Plan                                                     1. Type 2 diabetes mellitus   Uncontrolled as evidenced by most recent 9.6%, above goal less than 7% per ADA guidelines.  Diabetes education provided, including discussion on ways to manage this condition - diet, exercise, and medication. Currently on a regimen of basal bolus insulin plus metformin.  Discussed options for improved diabetes control, and would prefer to avoid further increasing insulin doses.  Recommended addition of GLP-1 agonist or SGLT2 inhibitor. Patient is hesitant to make any medication changes, but after further discussion was willing to start Jardiance. Medication education and counseling was provided, including indication, expected effect, dosing instructions, and possible side effects. The patient's questions were answered.  Typically we do decrease dose of thiazide diuretic when starting this medication, but given his high blood pressure and normal electrolytes, will continue with same dosing and plan on monitoring blood pressure and BMP at follow-up. Emphasized importance of lifestyle modification with a focus on lowering carbohydrate intake with increase vegetables, protein, and healthy fat.  For future consideration did discuss alternative insulin options that would be less expensive such as Novolin 70/30.  Also discussed possibility of continuous glucose monitor which would likely be covered since he is on multiple daily doses of insulin and would provide additional data for improved control, but patient declined at this time.   Plan   1. Start Jardiance 10 mg daily.     2. HTN (hypertension)  Blood pressure is above goal of <140/90 mm Hg per JNC-8 hypertension guidelines.  Currently on max recommended doses of amlodipine, valsartan, and hydrochlorothiazide, but there is room to increase metoprolol dose.  Because we are starting SGLT2 inhibitor which does have blood pressure lowering properties,  we will plan on monitoring his response to this medication change first.     3. Hyperlipidemia  Appropriately on a moderate intensity statin given age and diabetes diagnosis per ACC/AHA guidelines. Last LDL of 83 mg/dl.     4. Vitamin D deficiency  Most recent vitamin D level WNL. Reviewed other supplement use as well and med rec completed.       Follow Up  Return in about 1 month (around 2019).      Subjective & Objective                                                     Artem Pryor is a 64 y.o. male coming in for an initial visit for Medication Therapy Management. He was referred to me from Reina Seaman MD    Chief Complaint: uncontrolled diabetes  Medication Adherence/Access: He works in the healthcare field in IT. He would prefer to take less medication if possible. He says insulin has been expensive. He can afford it, but would prefer to pay less.     1. Type 2 diabetes mellitus   Artem is taking Lantus 30 units in the morning and 50 units in the evening, Humalog 1 unit per 3 g of carbs plus correction scale (8-14 AM, 6 with lunch, and 23 with dinner), and metformin 1000 mg twice daily. He takes aspirin 81 mg daily. He says he struggles with high morning readings. No lows recently, but he knows what that feels like. He has used Byetta in the past, but was stopped when he started insulin. He has been on glimepiride. He is paying about $300 for 90 days of insulin. He eats three times a day and sometimes a glass of milk before bed. Morning BG are highest. Typically around 200. Before meals is 130-150. In the evening it can be 110-115.     Lab Results   Component Value Date    HGBA1C 9.6 (H) 2019:  Serum creatinine: 1 mg/dl   LDL: 83 mg/dl  T mg/dl   HDL: 37 mg/dl  TC: 149 mg/dl     2. HTN (hypertension)  Artem is taking amlodipine 10 mg daily, metoprolol XL 50 mg daily, and valsartan hydrochlorothiazide 320-25 mg daily. He hasn't checked BP at home recently.     3.  Hyperlipidemia  Artem is taking atorvastatin 10 mg daily.  His calculated 10-year ASCVD risk score is 16%.    4. Vitamin D deficiency  Artem is taking vitamin D3 5,000 IU daily.  He takes a number of other supplements including vitamin C, high enzyme, vitamin B12, gingerroot, probiotic, multivitamin, potassium citrate.  He states many of these were started after a Lyme's disease diagnosis and he feels they have been helpful.    Vitamin D, Total (25-Hydroxy)   Date Value Ref Range Status   08/16/2019 32.3 30.0 - 80.0 ng/mL Final         PMH: reviewed in EPIC   Allergies/ADRs: reviewed in EPIC   Alcohol: yes, infrequently  Tobacco:   Social History     Tobacco Use   Smoking Status Former Smoker   Smokeless Tobacco Never Used     Today's Vitals:   Vitals:    10/04/19 0845 10/04/19 0856   BP: 144/74 142/72   Pulse: 80         BP Readings from Last 3 Encounters:   10/04/19 142/72   09/27/19 130/78   09/19/19 157/84     Pulse Readings from Last 3 Encounters:   10/04/19 80   09/27/19 80   09/19/19 76     Wt Readings from Last 3 Encounters:   09/27/19 (!) 291 lb 1.6 oz (132 kg)   09/19/19 (!) 289 lb 4.8 oz (131.2 kg)   08/16/19 (!) 288 lb 8 oz (130.9 kg)     ----------------    The patient was given a summary of these recommendations via General Blood    I spent 60 minutes with this patient today.   All changes were made via collaborative practice agreement with Reina Seaman MD. A copy of the visit note was provided to the patient's provider.     Neeru Kimball, PharmD, BCACP  Medication Management Pharmacist  [facility]        Current Outpatient Medications   Medication Sig Dispense Refill     cholecalciferol, vitamin D3, 5,000 unit Tab Take 5,000 Units by mouth daily.       amLODIPine (NORVASC) 10 MG tablet Take 1 tablet (10 mg total) by mouth daily. 90 tablet 3     ASCORBIC ACID, VITAMIN C, ORAL Take 1 tablet by mouth daily.             aspirin 81 mg chewable tablet Chew 1 tablet (81 mg total)  "daily. 90 tablet 3     atorvastatin (LIPITOR) 10 MG tablet Take 1 tablet (10 mg total) by mouth daily. 90 tablet 3     BD ULTRA-FINE MINI PEN NEEDLE 31 gauge x 3/16\" Ndle Check blood sugars qid 500 each 3     blood glucose test (ONETOUCH ULTRA BLUE TEST STRIP) strips Test 3 times a day, dx E11.65, on insulin, 90 day supply       carica papaya (PAPAYA ENZYME) Tab Take 1 tablet by mouth daily.             CYANOCOBALAMIN, VITAMIN B-12, ORAL Take 1 tablet by mouth daily.             empagliflozin (JARDIANCE) 10 mg Tab Take 1 tablet (10 mg total) by mouth daily. 30 tablet 3     ginger root (GINGER, ZINGIBER OFFICINALIS,) 550 mg cap Take 1 capsule by mouth daily.             insulin glargine (LANTUS SOLOSTAR PEN) 100 unit/mL (3 mL) pen 30 units in the morning and 50 units at bed time daily       insulin lispro (HUMALOG KWIKPEN) 100 unit/mL pen 11.65 Type 2 with hyperglycemia. Inject 1 unit per 3 grams of carbohydrate plus correction with each meal 5 adj dose pen 11     Lactobacillus rhamnosus-fiber 2.5 billion cell-3.5 gram PwPk Take 1 packet by mouth daily.             lancets (ONETOUCH DELICA LANCETS) 33 gauge Misc Testing 3 times daily, Dx code 250.00 90 day supply,  Formulary change send after 1/1/14       metFORMIN (GLUMETZA) 1000 MG (MOD) 24 hr tablet Take 1,000 mg by mouth 2 (two) times a day.             metoprolol succinate (TOPROL-XL) 50 MG 24 hr tablet Take 1 tablet (50 mg total) by mouth daily. 90 tablet 3     multivitamin-Ca-iron-minerals Tab Take 1 tablet by mouth daily.             POTASSIUM CITRATE ORAL Take 2 tablets by mouth daily.             ranitidine (ZANTAC) 300 MG tablet 300 mg daily as needed.             valsartan-hydrochlorothiazide (DIOVAN-HCT) 320-25 mg per tablet Take 1 tablet by mouth daily. 90 tablet 3     No current facility-administered medications for this visit.              "

## 2021-06-03 VITALS
BODY MASS INDEX: 36.8 KG/M2 | OXYGEN SATURATION: 98 % | HEART RATE: 82 BPM | SYSTOLIC BLOOD PRESSURE: 132 MMHG | WEIGHT: 278.9 LBS | DIASTOLIC BLOOD PRESSURE: 72 MMHG

## 2021-06-03 VITALS
SYSTOLIC BLOOD PRESSURE: 130 MMHG | WEIGHT: 291.1 LBS | BODY MASS INDEX: 38.41 KG/M2 | HEART RATE: 80 BPM | DIASTOLIC BLOOD PRESSURE: 78 MMHG | OXYGEN SATURATION: 97 %

## 2021-06-03 VITALS
OXYGEN SATURATION: 98 % | BODY MASS INDEX: 37.51 KG/M2 | WEIGHT: 284.3 LBS | DIASTOLIC BLOOD PRESSURE: 80 MMHG | SYSTOLIC BLOOD PRESSURE: 140 MMHG | HEART RATE: 67 BPM

## 2021-06-03 VITALS
DIASTOLIC BLOOD PRESSURE: 84 MMHG | WEIGHT: 289.3 LBS | TEMPERATURE: 98.8 F | OXYGEN SATURATION: 95 % | HEART RATE: 76 BPM | SYSTOLIC BLOOD PRESSURE: 157 MMHG | BODY MASS INDEX: 38.34 KG/M2 | HEIGHT: 73 IN

## 2021-06-03 VITALS — HEIGHT: 72 IN | BODY MASS INDEX: 39.08 KG/M2 | WEIGHT: 288.5 LBS

## 2021-06-03 NOTE — TELEPHONE ENCOUNTER
RN cannot approve Refill Request    RN can NOT refill this medication PCP messaged that patient is overdue for Labs. Last office visit: 11/15/2019 Reina Seaman MD Last Physical: 2019 Last MTM visit: Visit date not found Last visit same specialty: 11/15/2019 Reina Seaman MD.  Next visit within 3 mo: Visit date not found  Next physical within 3 mo: Visit date not found      [facility] Triage/Med Refill 2019    Requested Prescriptions   Pending Prescriptions Disp Refills     insulin glargine (LANTUS SOLOSTAR PEN) 100 unit/mL (3 mL) pen 5 adj dose pen 0     Si units in the morning and 50 units at bed time daily       Insulin/GLP-1 Refill Protocol Failed - 2019  9:03 AM        Failed - Microalbumin in last year     No results found for: MICROALBUR               Passed - Visit with PCP or prescribing provider visit in last 6 months     Last office visit with prescriber/PCP: 11/15/2019 OR same dept: 11/15/2019 Reina Seaman MD OR same specialty: 11/15/2019 Reina Seaman MD Last physical: 2019 Last MTM visit: Visit date not found     Next appt within 3 mo: Visit date not found  Next physical within 3 mo: Visit date not found  Prescriber OR PCP: Reina Seaman MD  Last diagnosis associated with med order: 1. Type 2 diabetes mellitus with complication, with long-term current use of insulin (H)  - insulin glargine (LANTUS SOLOSTAR PEN) 100 unit/mL (3 mL) pen; 30 units in the morning and 50 units at bed time daily  Dispense: 5 adj dose pen; Refill: 0    If protocol passes may refill for 6 months if within 3 months of last provider visit (or a total of 9 months).              Passed - A1C in last 6 months     Hemoglobin A1c   Date Value Ref Range Status   2019 9.6 (H) 3.5 - 6.0 % Final               Passed - Blood pressure in last year     BP Readings from Last 1 Encounters:   11/15/19 140/80             Passed - Creatinine done in last year      Creatinine   Date Value Ref Range Status   11/15/2019 1.02 0.70 - 1.30 mg/dL Final

## 2021-06-03 NOTE — TELEPHONE ENCOUNTER
Medication Question or Clarification  Who is calling: Other: Wife  What medication are you calling about? (include dose and sig)   empagliflozin (JARDIANCE) 10 mg Tab 30 tablet 3 10/4/2019     Sig - Route: Take 1 tablet (10 mg total) by mouth daily. - Oral    Sent to pharmacy as: empagliflozin 10 mg tablet (JARDIANCE)    E-Prescribing Status: Receipt confirmed by pharmacy (10/4/2019  8:40 AM CDT)        Who prescribed the medication?: Reina Seaman MD  What is your question/concern?: We are requesting this medication to be sent to [email or web address] for 90 days.  Pharmacy: [email or web address]   Okay to leave a detailed message?: Yes  Site CMT - Please call the pharmacy to obtain any additional needed information.

## 2021-06-03 NOTE — PATIENT INSTRUCTIONS - HE
No change in medications at this time.  I will notify you of lab results.  Please continue to cut out or reduce your short acting insulin at lunch time depending on your blood sugar and your planned meal.

## 2021-06-03 NOTE — PROGRESS NOTES
Assessment/Plan:      Problem List Items Addressed This Visit     HTN (hypertension), benign     Adequate control. Continue current medications.         Type 2 diabetes mellitus with complication, with long-term current use of insulin (H) - Primary     The patient started on jardiance a couple of weeks ago and that is going well. Blood sugars are improving on the new medication. He has had intermittent low blood sugars before his evening meal so he is elliminating lunch time insulin if BS is not high and he is having limited carbs. No other change in his medication at this time. BMP today. Follow up with A1C and office visit in about 6 weeks.         Relevant Medications    metFORMIN (GLUMETZA) 1000 MG (MOD) 24 hr tablet    Other Relevant Orders    Basic Metabolic Panel          Patient Instructions   No change in medications at this time.  I will notify you of lab results.  Please continue to cut out or reduce your short acting insulin at lunch time depending on your blood sugar and your planned meal.      Return in about 6 weeks (around 12/27/2019) for Diabetes.    Subjective:   Artem Pryor is a 64 y.o. male who presents today for recheck on his diabetes. He reports things are going well. He met with the [facility] pharmacist and started on jardiance which is tolerating well. Morning blood sugars have come down some and are about 150. He is having borderline lows of  prior to his evening meal. No other concerns today.    Patient Active Problem List   Diagnosis     Type 2 diabetes mellitus with complication, with long-term current use of insulin (H)     HTN (hypertension), benign     Hyperlipidemia with target LDL less than 100     Microalbuminuria     Vitamin D deficiency     Anemia of chronic disease     Diabetic polyneuropathy associated with type 2 diabetes mellitus (H)     Monocytosis     Mild nonproliferative diabetic retinopathy (H)      Past Medical History:   Diagnosis Date     Diabetes mellitus (H)       Hypertension      Lyme disease      History reviewed. No pertinent surgical history.    Review of Systems   Respiratory: Negative for cough, chest tightness, shortness of breath and wheezing.    Cardiovascular: Negative for chest pain, palpitations and leg swelling.         Objective:     Vitals:    11/15/19 0742   BP: 140/80   Pulse: 67   SpO2: 98%   Weight: (!) 284 lb 4.8 oz (129 kg)       Physical Exam  Constitutional:       Appearance: He is not ill-appearing.   Cardiovascular:      Rate and Rhythm: Normal rate and regular rhythm.      Heart sounds: No murmur.   Pulmonary:      Effort: Pulmonary effort is normal.      Breath sounds: Normal breath sounds. No wheezing or rhonchi.   Psychiatric:         Mood and Affect: Mood normal.

## 2021-06-04 VITALS
SYSTOLIC BLOOD PRESSURE: 138 MMHG | WEIGHT: 293 LBS | BODY MASS INDEX: 38.83 KG/M2 | OXYGEN SATURATION: 98 % | DIASTOLIC BLOOD PRESSURE: 70 MMHG | HEART RATE: 58 BPM | HEIGHT: 73 IN

## 2021-06-04 NOTE — PATIENT INSTRUCTIONS - HE
Increase Lantus to 60 units at bedtime. Continue 30 units in the morning. Continue other medications the same.  We will notify you of other lab results.

## 2021-06-04 NOTE — PROGRESS NOTES
"Assessment/Plan:      Problem List Items Addressed This Visit     HTN (hypertension), benign     Well controlled. Renal profile today. Continue amlodipine 10 mg daily, Metoprolol 50 mg daily, valasartan 320/25 mg daily.         Relevant Orders    Renal Function Profile    Hyperlipidemia with target LDL less than 100    Relevant Orders    Lipid Profile    Type 2 diabetes mellitus with complication, with long-term current use of insulin (H) - Primary     A1C still elevated at 8.2 but improved from 9.6 at previous visit. Since it is primarily his fasting sugars that are high, we will increase his Lantus to 60 units at bedtime and 30 units in the morning. Continue other medications unchanged: Metformin 1000 two times a day, empagliflozin 10 mg daily, and sliding scale Lispro 1 unit per carb plus correction. Recheck in 3 months.         Relevant Medications    BD ULTRA-FINE MINI PEN NEEDLE 31 gauge x 3/16\" Ndle    insulin glargine (LANTUS SOLOSTAR PEN) 100 unit/mL (3 mL) pen    Other Relevant Orders    Glycosylated Hemoglobin A1c (Completed)      Other Visit Diagnoses     Need for vaccination        Relevant Orders    Tdap vaccine,  6yo or older,  IM (Completed)          Patient Instructions   Increase Lantus to 60 units at bedtime. Continue 30 units in the morning. Continue other medications the same.  We will notify you of other lab results.        Return in about 3 months (around 3/27/2020) for Diabetes.    Subjective:   Artem Pryor is a 64 y.o. male who presents today for recheck of his diabetes. He reports his blood sugars have been running 175-200 fasting. No significant low blood sugars. In the evening prior to dinner his numbers are about 110.       Patient Active Problem List   Diagnosis     Type 2 diabetes mellitus with complication, with long-term current use of insulin (H)     HTN (hypertension), benign     Hyperlipidemia with target LDL less than 100     Microalbuminuria     Vitamin D deficiency     " Anemia of chronic disease     Diabetic polyneuropathy associated with type 2 diabetes mellitus (H)     Monocytosis     Mild nonproliferative diabetic retinopathy (H)      Past Medical History:   Diagnosis Date     Diabetes mellitus (H)      Hypertension      Lyme disease      History reviewed. No pertinent surgical history.    Review of Systems   Respiratory: Negative for cough, chest tightness, shortness of breath and wheezing.    Cardiovascular: Negative for chest pain, palpitations and leg swelling.         Objective:     Vitals:    12/27/19 0745 12/27/19 0802   BP: 146/78 132/72   Pulse: 82    SpO2: 98%    Weight: (!) 278 lb 14.4 oz (126.5 kg)        Physical Exam  Constitutional:       General: He is not in acute distress.     Appearance: He is not ill-appearing.   Cardiovascular:      Rate and Rhythm: Normal rate and regular rhythm.      Heart sounds: No murmur.   Pulmonary:      Effort: No respiratory distress.      Breath sounds: No wheezing or rhonchi.   Musculoskeletal:      Right lower leg: No edema.      Left lower leg: No edema.   Psychiatric:         Mood and Affect: Mood normal.

## 2021-06-05 VITALS
HEART RATE: 60 BPM | DIASTOLIC BLOOD PRESSURE: 72 MMHG | TEMPERATURE: 98.5 F | RESPIRATION RATE: 16 BRPM | SYSTOLIC BLOOD PRESSURE: 128 MMHG | WEIGHT: 286.31 LBS | HEIGHT: 73 IN | BODY MASS INDEX: 37.95 KG/M2

## 2021-06-05 VITALS
RESPIRATION RATE: 16 BRPM | HEIGHT: 72 IN | SYSTOLIC BLOOD PRESSURE: 132 MMHG | WEIGHT: 283.38 LBS | DIASTOLIC BLOOD PRESSURE: 70 MMHG | TEMPERATURE: 98.5 F | HEART RATE: 60 BPM | BODY MASS INDEX: 38.38 KG/M2

## 2021-06-06 NOTE — TELEPHONE ENCOUNTER
Please ask patient if we can change his appt to a telephone visit.     Aurora Molina, Clinic Assistant

## 2021-06-06 NOTE — TELEPHONE ENCOUNTER
Patient Returning Call  Reason for call:  Return call   Information relayed to patient:  Caller was informed of message below.   Patient has additional questions:  Yes  If YES, what are your questions/concerns:  Caller stated that he will just have to reschedule his appointment and will skip the phone visit for now. Caller stated that he mainly needs lab done and with the virus will wait to come into clinic.   Okay to leave a detailed message?: No call back needed

## 2021-06-08 NOTE — TELEPHONE ENCOUNTER
Needs 3 months worth of meds sent to mail order.  Please adjust quantity.       Nory Rodriges CMA 5/18/2020 9:37 AM   [facility]

## 2021-06-08 NOTE — TELEPHONE ENCOUNTER
Patient overdue for recheck of diabetes. Please contact him to schedule virtual visit. Please also schedule lab appointment a day or two prior to visit for A1C and urine microalbumin. Orders placed. Refill sent.

## 2021-06-08 NOTE — TELEPHONE ENCOUNTER
RN cannot approve Refill Request    RN can NOT refill this medication Protocol failed and NO refill given.       Ruchi Zhu, [facility] Triage/Med Refill 5/12/2020    Requested Prescriptions   Pending Prescriptions Disp Refills     insulin lispro (HUMALOG KWIKPEN INSULIN) 100 unit/mL pen [Pharmacy Med Name: HUMALOG KWKPN U100 3ML 5'S 100U/ML] 15 mL 13     Sig: INJECT 1 UNITS PER 3 GRAMS OF CARBOHYDRATE PLUS CORRECTION WITH EACH MEAL FOR TYPE 2 WITH HYPERGLYCEMIA       Insulin/GLP-1 Refill Protocol Failed - 5/10/2020  9:23 AM        Failed - Microalbumin in last year     No results found for: MICROALBUR               Passed - Visit with PCP or prescribing provider visit in last 6 months     Last office visit with prescriber/PCP: 12/27/2019 OR same dept: 12/27/2019 Reina Seaman MD OR same specialty: 12/27/2019 Reina Seaman MD Last physical: Visit date not found Last MTM visit: Visit date not found     Next appt within 3 mo: Visit date not found  Next physical within 3 mo: Visit date not found  Prescriber OR PCP: Reina Seaman MD  Last diagnosis associated with med order: 1. Type 2 diabetes mellitus with complication, with long-term current use of insulin (H)  - HUMALOG KWIKPEN INSULIN 100 unit/mL pen [Pharmacy Med Name: HUMALOG KWKPN U100 3ML 5'S 100U/ML]; INJECT 1 UNITS PER 3 GRAMS OF CARBOHYDRATE PLUS CORRECTION WITH EACH MEAL FOR TYPE 2 WITH HYPERGLYCEMIA  Dispense: 15 mL; Refill: 13    If protocol passes may refill for 6 months if within 3 months of last provider visit (or a total of 9 months).              Passed - A1C in last 6 months     Hemoglobin A1c   Date Value Ref Range Status   12/27/2019 8.2 (H) 3.5 - 6.0 % Final               Passed - Blood pressure in last year     BP Readings from Last 1 Encounters:   12/27/19 132/72             Passed - Creatinine done in last year     Creatinine   Date Value Ref Range Status   12/27/2019 1.09 0.70 - 1.30 mg/dL Final

## 2021-06-08 NOTE — TELEPHONE ENCOUNTER
TYLER for patient to call back and schedule a VV with Dr. Seaman and a lab visit two days prior to his VV

## 2021-06-08 NOTE — TELEPHONE ENCOUNTER
Refill Approved    Rx renewed per Medication Renewal Policy. Medication was last renewed on 8/16/19.    Ruchi Zhu, [facility] Triage/Med Refill 6/1/2020     Requested Prescriptions   Pending Prescriptions Disp Refills     aspirin 81 mg chewable tablet [Pharmacy Med Name: ASPIRIN LOW DOSE CHEW TABS 81MG] 90 tablet 3     Sig: CHEW 1 TABLET DAILY       Aspirin/Dipyridamole Refill Protocol Passed - 5/27/2020 11:31 PM        Passed - PCP or prescribing provider visit in past 12 months       Last office visit with prescriber/PCP: 12/27/2019 Reina Seaman MD OR same dept: 12/27/2019 Reina Seaman MD OR same specialty: 12/27/2019 Reina Seaman MD  Last physical: 8/16/2019 Last MTM visit: Visit date not found    Next appt within 3 mo: Visit date not found Next physical within 3 mo: Visit date not found  Prescriber OR PCP: Reina Seaman MD  Last diagnosis associated with med order: 1. Type 2 diabetes mellitus with complication, with long-term current use of insulin (H)  - aspirin 81 mg chewable tablet [Pharmacy Med Name: ASPIRIN LOW DOSE CHEW TABS 81MG]; CHEW 1 TABLET DAILY  Dispense: 90 tablet; Refill: 3    If protocol passes may refill for 6 months if within 3 months of last provider visit (or a total of 9 months).

## 2021-06-09 NOTE — TELEPHONE ENCOUNTER
Patient called and notified of medication adjustment.     Nory Rodriges CMA 7/16/2020 3:42 PM   [facility]

## 2021-06-09 NOTE — TELEPHONE ENCOUNTER
I would recommend change to immediate release metformin. Continue current dosing of 1000 mg twice daily. New rx sent to pharmacy.

## 2021-06-09 NOTE — PATIENT INSTRUCTIONS - HE
1. Continue current medications.  2. I will notify you of lab results.  3. Please complete your eye exam as scheduled.  4. I would like to see you back in 6 months.

## 2021-06-09 NOTE — PROGRESS NOTES
Assessment/Plan:      Problem List Items Addressed This Visit     Diabetic polyneuropathy associated with type 2 diabetes mellitus (H)     Patient not having any pain at this time. No skin breakdown. He will continue with proper skin care. We will continue to aim for good glucose control.         Relevant Medications    insulin glargine (LANTUS SOLOSTAR PEN) 100 unit/mL (3 mL) pen    insulin lispro (HUMALOG KWIKPEN INSULIN) 100 unit/mL pen    Essential hypertension     Well controlled. Continue amlodipine 10 mg, metoprolol 50 mg, and valasartan-hctz 320-25 daily. BMP today. Recheck in 6 months.         Relevant Medications    amLODIPine (NORVASC) 10 MG tablet    metoprolol succinate (TOPROL-XL) 50 MG 24 hr tablet    valsartan-hydrochlorothiazide (DIOVAN-HCT) 320-25 mg per tablet    Hyperlipidemia with target LDL less than 100    Relevant Medications    atorvastatin (LIPITOR) 10 MG tablet    Type 2 diabetes mellitus with complication, with long-term current use of insulin (H) - Primary     A1C is 7.7 today which is a significant improvement from previous numbers. Artem is happy with this level of control and I am in agreement with him. Continue current management. Recheck in 6 months.         Relevant Medications    empagliflozin (JARDIANCE) 10 mg Tab    metFORMIN (GLUMETZA) 1000 MG (MOD) 24 hr tablet    insulin glargine (LANTUS SOLOSTAR PEN) 100 unit/mL (3 mL) pen    insulin lispro (HUMALOG KWIKPEN INSULIN) 100 unit/mL pen    Other Relevant Orders    Basic Metabolic Panel    Vitamin B12      Other Visit Diagnoses     Seborrheic keratosis        Patient reassured about benign nature of this lesion.          Patient Instructions   1. Continue current medications.  2. I will notify you of lab results.  3. Please complete your eye exam as scheduled.  4. I would like to see you back in 6 months.          Return in about 6 months (around 1/10/2021) for Diabetes.    Subjective:   Artem Pryor is a 64 y.o. male who  presents today for recheck on his diabetes. He reports blood sugars are doing well in the morning about 130-160. His postprandial in the evening are 160-190. He has had very occasional low blood sugars but no pattern of lows.     The patient has noticed a skin lesion on his left temple. His wife has noticed that it is changing. There is a family history melanoma.    Patient Active Problem List   Diagnosis     Type 2 diabetes mellitus with complication, with long-term current use of insulin (H)     Essential hypertension     Hyperlipidemia with target LDL less than 100     Microalbuminuria     Vitamin D deficiency     Anemia of chronic disease     Diabetic polyneuropathy associated with type 2 diabetes mellitus (H)     Monocytosis     Mild nonproliferative diabetic retinopathy (H)      Past Medical History:   Diagnosis Date     Diabetes mellitus (H)      Hypertension      Lyme disease      No past surgical history on file.    Review of Systems   Constitutional: Negative for activity change, appetite change, fever and unexpected weight change.   HENT: Negative for congestion, hearing loss and sore throat.    Eyes: Negative for discharge and visual disturbance.   Respiratory: Negative for cough, shortness of breath and wheezing.    Cardiovascular: Negative for chest pain, palpitations and leg swelling.   Gastrointestinal: Negative for abdominal pain, blood in stool, constipation, diarrhea and vomiting.   Endocrine: Negative for polydipsia and polyuria.   Genitourinary: Negative for decreased urine volume, difficulty urinating, dysuria, frequency, hematuria and urgency.   Musculoskeletal: Negative for arthralgias, gait problem and myalgias.   Skin: Negative for rash.   Allergic/Immunologic: Negative for immunocompromised state.   Neurological: Negative for weakness.   Hematological: Does not bruise/bleed easily.   Psychiatric/Behavioral: Negative for dysphoric mood and sleep disturbance. The patient is not  "nervous/anxious.          Objective:     Vitals:    07/10/20 0747   BP: 138/70   Pulse: (!) 58   SpO2: 98%   Weight: (!) 293 lb (132.9 kg)   Height: 6' 0.84\" (1.85 m)       Physical Exam  Constitutional:       General: He is not in acute distress.     Appearance: He is not ill-appearing.   Cardiovascular:      Rate and Rhythm: Normal rate and regular rhythm.      Pulses:           Dorsalis pedis pulses are 1+ on the right side and 1+ on the left side.      Heart sounds: No murmur.   Pulmonary:      Effort: Pulmonary effort is normal.      Breath sounds: Normal breath sounds. No wheezing or rhonchi.   Feet:      Right foot:      Protective Sensation: 10 sites tested. 7 sites sensed.      Skin integrity: Skin integrity normal.      Left foot:      Protective Sensation: 10 sites tested. 7 sites sensed.      Skin integrity: Skin integrity normal.      Comments: Some decreased sensation at the distal portions of toes on both feet.  Skin:     Comments: Small light brown lesion left temple with uniform color and shape. Stuck on appearance.        "

## 2021-06-09 NOTE — TELEPHONE ENCOUNTER
Drug Change Request  Who is calling?:  Pharmacy  What is the current medication?:     Disp  Refills  Start  End     metFORMIN (GLUMETZA) 1000 MG (MOD) 24 hr tablet  180 tablet  3  7/10/2020      Sig - Route: Take 1 tablet (1,000 mg total) by mouth 2 (two) times a day. - Oral     Sent to pharmacy as: metFORMIN ER 1,000 mg 24 hr tablet,extended release (GLUMETZA)     E-Prescribing Status: Receipt confirmed by pharmacy (7/10/2020  8:25 AM CDT)       What alternative is being requested?: Glucophage  mg   Why the request to change?:  Current medication on recall  Requested Pharmacy?: [facility]  Okay to leave a detailed message?:  Yes  [phone] reference #[id number]

## 2021-06-11 NOTE — TELEPHONE ENCOUNTER
Forms Request  Name of form/paperwork: insulin treated diabetes mellitus report  Have you been seen for this request: yes  Do we have the form: in your mailbox  When is form needed by: ASAP  How would you like the form returned: Patient will   Patient Notified form requests are processed in 3-5 business days:yes  Okay to leave a detailed message?yes  [phone]

## 2021-06-11 NOTE — TELEPHONE ENCOUNTER
"Refill Approved    Rx renewed per Medication Renewal Policy. Medication was last renewed on 12/27/191.    Ruchi Zhu, [facility] Triage/Med Refill 9/8/2020     Requested Prescriptions   Pending Prescriptions Disp Refills     BD ULTRA-FINE MINI PEN NEEDLE 31 gauge x 3/16\" Ndle [Pharmacy Med Name: BD PEN JEREMIE UF MINI 5MM 31G3/16] 450 each 3     Sig: USE TO CHECK BLOOD SUGARS FOUR TIMES A DAY       Diabetic Supplies Refill Protocol Passed - 9/7/2020 12:01 AM        Passed - Visit with PCP or prescribing provider visit in last 6 months     Last office visit with prescriber/PCP: 7/10/2020 Reina Seaman MD OR same dept: 12/27/2019 Reina Seaman MD OR same specialty: 7/10/2020 Reina Seaman MD  Last physical: 8/16/2019 Last MTM visit: Visit date not found   Next visit within 3 mo: Visit date not found  Next physical within 3 mo: Visit date not found  Prescriber OR PCP: Reina Seaman MD  Last diagnosis associated with med order: 1. Type 2 diabetes mellitus with complication, with long-term current use of insulin (H)  - BD ULTRA-FINE MINI PEN NEEDLE 31 gauge x 3/16\" Ndle [Pharmacy Med Name: BD PEN JEREMIE UF MINI 5MM 31G3/16]; USE TO CHECK BLOOD SUGARS FOUR TIMES A DAY  Dispense: 450 each; Refill: 3    If protocol passes may refill for 12 months if within 3 months of last provider visit (or a total of 15 months).             Passed - A1C in last 6 months     Hemoglobin A1c   Date Value Ref Range Status   07/10/2020 7.7 (H) 3.5 - 6.0 % Final                              "

## 2021-06-13 NOTE — TELEPHONE ENCOUNTER
Refill Approved    Rx renewed per Medication Renewal Policy. Medication was last renewed on 6/1/20.    Ruchi Zhu, [facility] Triage/Med Refill 11/24/2020     Requested Prescriptions   Pending Prescriptions Disp Refills     aspirin 81 mg chewable tablet [Pharmacy Med Name: ASPIRIN LOW DOSE CHEW TABS 81MG] 90 tablet 3     Sig: CHEW 1 TABLET DAILY       Aspirin/Dipyridamole Refill Protocol Passed - 11/23/2020 11:55 PM        Passed - PCP or prescribing provider visit in past 12 months       Last office visit with prescriber/PCP: 7/10/2020 Reina Seaman MD OR same dept: 12/27/2019 Reina Seaman MD OR same specialty: 7/10/2020 Reina Seaman MD  Last physical: 8/16/2019 Last MTM visit: Visit date not found    Next appt within 3 mo: Visit date not found Next physical within 3 mo: Visit date not found  Prescriber OR PCP: Reina Seaman MD  Last diagnosis associated with med order: 1. Type 2 diabetes mellitus with complication, with long-term current use of insulin (H)  - aspirin 81 mg chewable tablet [Pharmacy Med Name: ASPIRIN LOW DOSE CHEW TABS 81MG]; CHEW 1 TABLET DAILY  Dispense: 90 tablet; Refill: 3    If protocol passes may refill for 6 months if within 3 months of last provider visit (or a total of 9 months).

## 2021-06-13 NOTE — PATIENT INSTRUCTIONS - HE
1. Continue current medications.  2. Recheck in 3 months.  3. Increase activity as able.  4. I will let you know about lab results.

## 2021-06-13 NOTE — PROGRESS NOTES
Assessment/Plan:      Problem List Items Addressed This Visit     Type 2 diabetes mellitus with complication, with long-term current use of insulin (H) - Primary     Fairly good control with an A1C of 7.8. We discussed an increase in insulin but the patient would prefer to work on adding in some exercise. Continue current dosing of medications. Recheck in 3 months.         Relevant Orders    Glycosylated Hemoglobin A1c (Completed)    Comprehensive Metabolic Panel    Essential hypertension     Well controlled. Continue current medications. Patient will work on adding in more exercise.         Relevant Orders    Comprehensive Metabolic Panel    Hyperlipidemia with target LDL less than 100    Relevant Orders    Lipid [place] FASTING    Comprehensive Metabolic Panel      Other Visit Diagnoses     Screening PSA (prostate specific antigen)        Relevant Orders    PSA, Annual Screen (Prostatic-Specific Antigen)          Patient Instructions   1. Continue current medications.  2. Recheck in 3 months.  3. Increase activity as able.  4. I will let you know about lab results.      Return in about 3 months (around 3/18/2021) for Diabetes.    Subjective:   Artem Pryor is a 65 y.o. male who presents today his fasting sugars have been 120-150 in the morning. Later in the day have been similar numbers.   Patient Active Problem List   Diagnosis     Type 2 diabetes mellitus with complication, with long-term current use of insulin (H)     Essential hypertension     Hyperlipidemia with target LDL less than 100     Microalbuminuria     Vitamin D deficiency     Anemia of chronic disease     Diabetic polyneuropathy associated with type 2 diabetes mellitus (H)     Monocytosis     Mild nonproliferative diabetic retinopathy (H)      Past Medical History:   Diagnosis Date     Diabetes mellitus (H)      Hypertension      Lyme disease      No past surgical history on file.    Review of Systems   Constitutional: Negative for appetite change  "and unexpected weight change.   Respiratory: Negative for cough, chest tightness, shortness of breath and wheezing.    Cardiovascular: Negative for chest pain, palpitations and leg swelling.   Gastrointestinal: Negative for abdominal distention, abdominal pain, blood in stool, constipation, diarrhea, nausea and vomiting.   Genitourinary: Negative for dysuria, frequency, genital sores, hematuria and urgency.         Objective:     Vitals:    12/18/20 0741 12/18/20 0757   BP: 140/74 128/72   Pulse: 60    Resp: 16    Temp: 98.5  F (36.9  C)    TempSrc: Oral    Weight: (!) 286 lb 5 oz (129.9 kg)    Height: 6' 0.7\" (1.847 m)        Physical Exam  Constitutional:       General: He is not in acute distress.     Appearance: He is not ill-appearing.   Cardiovascular:      Rate and Rhythm: Normal rate and regular rhythm.      Heart sounds: No murmur.   Pulmonary:      Effort: Pulmonary effort is normal.      Breath sounds: Normal breath sounds. No wheezing or rhonchi.   Musculoskeletal:      Right lower leg: No edema.      Left lower leg: No edema.   Neurological:      General: No focal deficit present.   Psychiatric:         Mood and Affect: Mood normal.       Results for orders placed or performed in visit on 12/18/20   Glycosylated Hemoglobin A1c   Result Value Ref Range    Hemoglobin A1c 7.8 (H) <=5.6 %      "

## 2021-06-15 NOTE — TELEPHONE ENCOUNTER
Called patient to see if he needed help scheduling. He will call back or schedule through [facility].

## 2021-06-16 PROBLEM — L98.9 SKIN LESION: Status: ACTIVE | Noted: 2021-04-02

## 2021-06-16 PROBLEM — D63.8 ANEMIA OF CHRONIC DISEASE: Status: ACTIVE | Noted: 2019-06-03

## 2021-06-16 PROBLEM — E11.8 TYPE 2 DIABETES MELLITUS WITH COMPLICATION, WITH LONG-TERM CURRENT USE OF INSULIN (H): Status: ACTIVE | Noted: 2017-04-28

## 2021-06-16 PROBLEM — Z79.4 TYPE 2 DIABETES MELLITUS WITH COMPLICATION, WITH LONG-TERM CURRENT USE OF INSULIN (H): Status: ACTIVE | Noted: 2017-04-28

## 2021-06-16 PROBLEM — D72.821 MONOCYTOSIS: Status: ACTIVE | Noted: 2018-02-13

## 2021-06-16 PROBLEM — E11.3299 MILD NONPROLIFERATIVE DIABETIC RETINOPATHY (H): Status: ACTIVE | Noted: 2019-10-01

## 2021-06-16 NOTE — TELEPHONE ENCOUNTER
rx for lantus was sent to mail order.  They require 90 days.  90 days for lantus is 81 ml.  rx prepped for 90 days below

## 2021-06-16 NOTE — TELEPHONE ENCOUNTER
Refill request from [facility] for: Valsartan/HCTZ tab    Order pend please sign if okay.     Last prescribed: 7/10/20  Last physical: 4/2/21

## 2021-06-16 NOTE — TELEPHONE ENCOUNTER
Reason for Call: needs rx to go to a different pharmacy . Does not want it to go to  [facility].  Supposed to be going to [facility].    Detailed comments: insulin glargine (LANTUS SOLOSTAR PEN) 100 unit/mL (3 mL) pen    Phone Number Patient can be reached at: Home number on file [phone] (home)    Best Time: any    Can we leave a detailed message on this number?: Yes    Call taken on 4/8/2021 at 2:08 PM by Emely Christianson

## 2021-06-16 NOTE — TELEPHONE ENCOUNTER
I made an error and left wrong pharm up on chart.  [facility].   Please send new rx [facility] when able

## 2021-06-16 NOTE — TELEPHONE ENCOUNTER
Please look into what [facility] is requesting. They did send a question about his Humalog on Tue and I responded to the question with a new prescription.

## 2021-06-16 NOTE — PROGRESS NOTES
"MALE PREVENTATIVE EXAM    Assessment and Plan:     Problem List Items Addressed This Visit     Type 2 diabetes mellitus with complication, with long-term current use of insulin (H)     Control sounds better according to home monitoring. A1C today. Patient is working hard on regular exercise. No regular low sugars but he will continue to monitor as he increases exercise.          Relevant Medications    empagliflozin (JARDIANCE) 10 mg Tab    insulin glargine (LANTUS SOLOSTAR PEN) 100 unit/mL (3 mL) pen    insulin lispro (HUMALOG KWIKPEN INSULIN) 100 unit/mL pen    lancets (ONETOUCH DELICA LANCETS) 33 gauge Misc    metFORMIN (GLUCOPHAGE) 1000 MG tablet    blood glucose test (ONETOUCH ULTRA BLUE TEST STRIP) strips    BD ULTRA-FINE MINI PEN NEEDLE 31 gauge x 3/16\" Ndle    Other Relevant Orders    Glycosylated Hemoglobin A1c (Completed)    Essential hypertension     Well controled. Continue current medications.         Relevant Medications    amLODIPine (NORVASC) 10 MG tablet    metoprolol succinate (TOPROL-XL) 50 MG 24 hr tablet    Hyperlipidemia with target LDL less than 100     Labs up to date. Continue atorvastatin.         Relevant Medications    atorvastatin (LIPITOR) 10 MG tablet    Diabetic polyneuropathy associated with type 2 diabetes mellitus (H)     Daily foot exams recommended. Other than lesion mentioned separately, skin is in excellent condition.         Relevant Medications    insulin glargine (LANTUS SOLOSTAR PEN) 100 unit/mL (3 mL) pen    insulin lispro (HUMALOG KWIKPEN INSULIN) 100 unit/mL pen    Obesity (BMI 35.0-39.9) with comorbidity (H)    Skin lesion     Dark lesion on the dorsum of his right 4th toe. This is most consistent with a blood blister and does coincide with an increase in walking. He is going to apply lotion and monitor daily. Referral placed to dermatology for check if not improving in 6 weeks.         Relevant Orders    Ambulatory referral to Dermatology      Other Visit Diagnoses     " Routine general medical examination at a health care facility    -  Primary        Patient Instructions   1. Continue current dosing of medications.  2. We will notify you of A1C results.  3. Please check your toe and apply lotion daily. If lesion not resolving in 6 weeks, I would like you to see dermatology.         Subjective:   HPI: Artem Pryor is an 65 y.o. male here for a preventative health visit. He is also needing follow up with his diabetes. He has working on increased physical activity. His blood sugars have been doing pretty well, under 120 fasting most of the time. He has occasional lows related to exercise.     No other concerns today.  Do you feel you are safe where you are living?: Yes (2021  7:34 AM)  Do you feel you are safe in your relationship(s)?: Yes (2021  7:34 AM)      Patient Active Problem List   Diagnosis     Type 2 diabetes mellitus with complication, with long-term current use of insulin (H)     Essential hypertension     Hyperlipidemia with target LDL less than 100     Microalbuminuria     Vitamin D deficiency     Anemia of chronic disease     Diabetic polyneuropathy associated with type 2 diabetes mellitus (H)     Monocytosis     Mild nonproliferative diabetic retinopathy (H)     Obesity (BMI 35.0-39.9) with comorbidity (H)     Skin lesion      Past Medical History:   Diagnosis Date     Diabetes mellitus (H)      Hypertension      Lyme disease       History reviewed. No pertinent surgical history.   Family History   Problem Relation Age of Onset     Breast cancer Mother 70     Stroke Mother      Prostate cancer Father 70     Melanoma Father 70     Heart failure Father      Colon cancer Maternal Grandfather 70     Colon cancer Maternal Uncle 85     Thyroid cancer Maternal cousin 25     Cancer Brother         uncertain type     Diabetes Brother       Social History     Tobacco Use     Smoking status: Former Smoker     Quit date:      Years since quittin.2     Smokeless  "tobacco: Never Used   Substance Use Topics     Alcohol use: Yes     Comment: A few times a year      Review of System: Relevant items noted in HPI. ROS otherwise negative.     Objective:   Vital Signs:   /70 (Patient Site: Left Arm, Patient Position: Sitting, Cuff Size: Adult Large)   Pulse 60   Temp 98.5  F (36.9  C) (Oral)   Resp 16   Ht 6' 0.3\" (1.836 m)   Wt (!) 283 lb 6 oz (128.5 kg)   BMI 38.11 kg/m       PHYSICAL EXAM  Physical Exam  Constitutional:       General: He is not in acute distress.     Appearance: Normal appearance. He is well-developed.   HENT:      Right Ear: Tympanic membrane and ear canal normal.      Left Ear: Tympanic membrane and ear canal normal.   Eyes:      Conjunctiva/sclera: Conjunctivae normal.      Pupils: Pupils are equal, round, and reactive to light.   Neck:      Musculoskeletal: Normal range of motion and neck supple.      Thyroid: No thyromegaly.   Cardiovascular:      Rate and Rhythm: Normal rate and regular rhythm.      Pulses:           Dorsalis pedis pulses are 3+ on the right side and 3+ on the left side.      Heart sounds: No murmur.   Pulmonary:      Effort: Pulmonary effort is normal. No respiratory distress.      Breath sounds: Normal breath sounds. No decreased breath sounds, wheezing or rhonchi.   Abdominal:      General: Bowel sounds are normal. There is no distension.      Palpations: Abdomen is soft. Abdomen is not rigid. There is no mass.      Tenderness: There is no abdominal tenderness. There is no guarding or rebound.      Hernia: There is no hernia in the ventral area or left inguinal area.   Genitourinary:     Penis: Normal.       Testes: Normal.         Right: Mass not present.         Left: Mass not present.      Prostate: Normal. Not enlarged.      Rectum: Normal. No mass. Normal anal tone.   Feet:      Right foot:      Protective Sensation: 10 sites tested. 6 sites sensed.      Skin integrity: Callus and dry skin present. No ulcer or blister. "      Left foot:      Protective Sensation: 10 sites tested. 6 sites sensed.      Skin integrity: Callus and dry skin present. No ulcer, blister or skin breakdown.      Comments: Right foot, 4th toe: There is a dark skin lesion measuring approximately 2 cm x 1 cm that is linear and coincides with a callous on the bottom of the toe. No ulceration or erythema. Normal surrounding capillary refill.    Decreased sensation throughout toes. Normal sensation starting midfoot bilaterally.  Lymphadenopathy:      Cervical: No cervical adenopathy.   Skin:     Findings: No rash.   Neurological:      Mental Status: He is alert and oriented to person, place, and time.      Cranial Nerves: No cranial nerve deficit.      Deep Tendon Reflexes:      Reflex Scores:       Patellar reflexes are 2+ on the right side and 2+ on the left side.  Psychiatric:         Behavior: Behavior normal.         Thought Content: Thought content normal.         Judgment: Judgment normal.

## 2021-06-19 NOTE — LETTER
Letter by Marga Smart MD at      Author: Marga Smart MD Service: -- Author Type: --    Filed:  Encounter Date: 8/29/2019 Status: (Other)       Dear Artem Pryor    Thank you for choosing [facility] for your care.  We are committed to providing you with the highest quality and compassionate healthcare services.  The following information pertains to your first appointment with our clinic.    Date/Time of appointment: Thursday, September 19, 2019 at 8:45 am.    Note: Please arrive 15 minutes prior to your appointment time.  This allows time to complete forms, possible labs and nursing assessment.     Name of your Physician: Marga Smart MD    What to bring to your appointment:    Completed Patient History/Initial Nursing Assessment and Medication/Allergy List (these forms were sent to you).    Any paperwork or films from your physician that we have asked you to bring.    Your current insurance card(s).    Parking:    Please refer to the map included to direct you.  The [facility] is located at the [address] end of [facility] in [place], [place].      After turning onto [address] from [facility], take a right turn at the first stop sign.  We have designated parking on the left, identified as parking for Cancer Care patients (Lot D).     The Code to Enter Lot D is: 0901. This code changes monthly and will always coincide with the current month followed by 01. For example August will be 0801.  The month will continue to change but the 01 will remain constant.  If lot D is full please use Parking Lot A, directly across the street.    Please enter the Cancer Care Center on the north end of the [facility].  You will see a sign on the building.        For  Hematology appointments, please take the elevator to the second floor to check in.     Also please note appointments can last 1.5-2 hours.      We hope these instructions are helpful to you.   If you have any questions or concerns, please call us at [phone].  It is our pleasure to assist you.    Warm Regards,      Kayla Valdez    [phone]

## 2021-07-04 NOTE — ADDENDUM NOTE
Addendum Note by Karey Bellamy CMA at 4/8/2021 11:32 AM     Author: Karey Bellamy CMA Service: -- Author Type: Medical Assistant    Filed: 4/8/2021 11:32 AM Encounter Date: 4/6/2021 Status: Signed    : Karey Bellamy CMA (Medical Assistant)    Addended by: KAREY BELLAMY on: 4/8/2021 11:32 AM        Modules accepted: Orders

## 2021-07-04 NOTE — ADDENDUM NOTE
Addendum Note by Reina Leyva MD at 4/8/2021 12:40 PM     Author: Reina Leyva MD Service: -- Author Type: Physician    Filed: 4/8/2021 12:40 PM Encounter Date: 4/6/2021 Status: Signed    : Reina Leyva MD (Physician)    Addended by: REINA LEYVA on: 4/8/2021 12:40 PM        Modules accepted: Orders

## 2021-10-01 ENCOUNTER — TRANSFERRED RECORDS (OUTPATIENT)
Dept: HEALTH INFORMATION MANAGEMENT | Facility: CLINIC | Age: 66
End: 2021-10-01
Payer: COMMERCIAL

## 2021-10-01 LAB — RETINOPATHY: NORMAL

## 2021-10-10 ENCOUNTER — HEALTH MAINTENANCE LETTER (OUTPATIENT)
Age: 66
End: 2021-10-10

## 2022-01-18 DIAGNOSIS — E78.5 HYPERLIPIDEMIA WITH TARGET LDL LESS THAN 100: ICD-10-CM

## 2022-01-18 DIAGNOSIS — I10 ESSENTIAL HYPERTENSION: ICD-10-CM

## 2022-01-18 DIAGNOSIS — E11.8 TYPE 2 DIABETES MELLITUS WITH COMPLICATION, WITH LONG-TERM CURRENT USE OF INSULIN (H): ICD-10-CM

## 2022-01-18 DIAGNOSIS — Z79.4 TYPE 2 DIABETES MELLITUS WITH COMPLICATION, WITH LONG-TERM CURRENT USE OF INSULIN (H): ICD-10-CM

## 2022-01-20 RX ORDER — AMLODIPINE BESYLATE 10 MG/1
TABLET ORAL
Qty: 90 TABLET | Refills: 0 | Status: SHIPPED | OUTPATIENT
Start: 2022-01-20 | End: 2022-03-01

## 2022-01-20 RX ORDER — METOPROLOL SUCCINATE 50 MG/1
TABLET, EXTENDED RELEASE ORAL
Qty: 90 TABLET | Refills: 0 | Status: SHIPPED | OUTPATIENT
Start: 2022-01-20 | End: 2022-03-01

## 2022-01-20 RX ORDER — VALSARTAN AND HYDROCHLOROTHIAZIDE 320; 25 MG/1; MG/1
TABLET, FILM COATED ORAL
Qty: 90 TABLET | Refills: 0 | Status: SHIPPED | OUTPATIENT
Start: 2022-01-20 | End: 2022-03-01

## 2022-01-20 RX ORDER — EMPAGLIFLOZIN 10 MG/1
TABLET, FILM COATED ORAL
Qty: 90 TABLET | Refills: 0 | Status: SHIPPED | OUTPATIENT
Start: 2022-01-20 | End: 2022-03-01

## 2022-01-20 RX ORDER — ATORVASTATIN CALCIUM 10 MG/1
TABLET, FILM COATED ORAL
Qty: 90 TABLET | Refills: 0 | Status: SHIPPED | OUTPATIENT
Start: 2022-01-20 | End: 2022-03-01

## 2022-01-20 NOTE — TELEPHONE ENCOUNTER
"Routing refill request to provider for review/approval because:  Labs out of range:  A1C  Labs not current:  A1C, Creatinine, Potassium, Sodium  Patient needs follow up on diabetes management.   Requesting refill too soon.     Last Written Prescription Date:  4/2/2021  Last Fill Quantity: 90,  # refills: 3   Last office visit provider:  4/2/2021     Requested Prescriptions   Pending Prescriptions Disp Refills     amLODIPine (NORVASC) 10 MG tablet [Pharmacy Med Name: amLODIPine Besylate 10 MG Oral Tablet] 90 tablet 3     Sig: TAKE 1 TABLET BY MOUTH  DAILY       Calcium Channel Blockers Protocol  Failed - 1/18/2022  4:40 AM        Failed - Normal serum creatinine on file in past 12 months     Recent Labs   Lab Test 12/18/20  0753   CR 0.98       Ok to refill medication if creatinine is low          Passed - Blood pressure under 140/90 in past 12 months     BP Readings from Last 3 Encounters:   04/02/21 132/70   12/18/20 128/72   07/10/20 138/70                 Passed - Recent (12 mo) or future (30 days) visit within the authorizing provider's specialty     Patient has had an office visit with the authorizing provider or a provider within the authorizing providers department within the previous 12 mos or has a future within next 30 days. See \"Patient Info\" tab in inbasket, or \"Choose Columns\" in Meds & Orders section of the refill encounter.              Passed - Medication is active on med list        Passed - Patient is age 18 or older      Last Written Prescription Date:  4/6/2021  Last Fill Quantity: 90,  # refills: 3   Last office visit provider:  4/2/2021      valsartan-hydrochlorothiazide (DIOVAN HCT) 320-25 MG tablet [Pharmacy Med Name: Valsartan-hydroCHLOROthiazide 320-25 MG Oral Tablet] 90 tablet 3     Sig: TAKE 1 TABLET BY MOUTH  DAILY       Angiotensin-II Receptors Failed - 1/18/2022  4:40 AM        Failed - Normal serum creatinine on file in past 12 months     Recent Labs   Lab Test 12/18/20  0753   CR " "0.98       Ok to refill medication if creatinine is low          Failed - Normal serum potassium on file in past 12 months     Recent Labs   Lab Test 12/18/20  0753   POTASSIUM 4.3                    Passed - Last blood pressure under 140/90 in past 12 months     BP Readings from Last 3 Encounters:   04/02/21 132/70   12/18/20 128/72   07/10/20 138/70                 Passed - Recent (12 mo) or future (30 days) visit within the authorizing provider's specialty     Patient has had an office visit with the authorizing provider or a provider within the authorizing providers department within the previous 12 mos or has a future within next 30 days. See \"Patient Info\" tab in inbasket, or \"Choose Columns\" in Meds & Orders section of the refill encounter.              Passed - Medication is active on med list        Passed - Patient is age 18 or older       Diuretics (Including Combos) Protocol Failed - 1/18/2022  4:40 AM        Failed - Normal serum creatinine on file in past 12 months     Recent Labs   Lab Test 12/18/20  0753   CR 0.98              Failed - Normal serum potassium on file in past 12 months     Recent Labs   Lab Test 12/18/20  0753   POTASSIUM 4.3                    Failed - Normal serum sodium on file in past 12 months     Recent Labs   Lab Test 12/18/20  0753                 Passed - Blood pressure under 140/90 in past 12 months     BP Readings from Last 3 Encounters:   04/02/21 132/70   12/18/20 128/72   07/10/20 138/70                 Passed - Recent (12 mo) or future (30 days) visit within the authorizing provider's specialty     Patient has had an office visit with the authorizing provider or a provider within the authorizing providers department within the previous 12 mos or has a future within next 30 days. See \"Patient Info\" tab in inbasket, or \"Choose Columns\" in Meds & Orders section of the refill encounter.              Passed - Medication is active on med list        Passed - Patient is " "age 18 or older      Last Written Prescription Date:  4/2/2021  Last Fill Quantity: 90,  # refills: 3   Last office visit provider:  4/2/2021      JARDIANCE 10 MG TABS tablet [Pharmacy Med Name: Jardiance 10 MG Oral Tablet] 90 tablet 3     Sig: TAKE 1 TABLET BY MOUTH  DAILY       Sodium Glucose Co-Transport Inhibitor Agents Failed - 1/18/2022  4:40 AM        Failed - Patient has documented A1c within the specified period of time.     If HgbA1C is 8 or greater, it needs to be on file within the past 3 months.  If less than 8, must be on file within the past 6 months.     Recent Labs   Lab Test 04/02/21  0815   A1C 7.2*             Failed - No creatinine >1.4 or GFR <45 within the past 12 mos     Recent Labs   Lab Test 12/18/20  0753   GFRESTIMATED >60   GFRESTBLACK >60       Recent Labs   Lab Test 12/18/20  0753   CR 0.98             Failed - Patient has documented normal Potassium within the last 12 mos.     Recent Labs   Lab Test 12/18/20  0753   POTASSIUM 4.3             Failed - Recent (6 mo) or future (30 days) visit within the authorizing provider's specialty     Patient had office visit in the last 6 months or has a visit in the next 30 days with authorizing provider or within the authorizing provider's specialty.  See \"Patient Info\" tab in inbasket, or \"Choose Columns\" in Meds & Orders section of the refill encounter.            Passed - Medication is active on med list        Passed - Patient is age 18 or older      Last Written Prescription Date:  4/2/21  Last Fill Quantity: 90,  # refills: 3   Last office visit provider:  4/2/2021      metoprolol succinate ER (TOPROL-XL) 50 MG 24 hr tablet [Pharmacy Med Name: Metoprolol Succinate ER 50 MG Oral Tablet Extended Release 24 Hour] 90 tablet 3     Sig: TAKE 1 TABLET BY MOUTH  DAILY       Beta-Blockers Protocol Passed - 1/18/2022  4:40 AM        Passed - Blood pressure under 140/90 in past 12 months     BP Readings from Last 3 Encounters:   04/02/21 132/70 " "  12/18/20 128/72   07/10/20 138/70                 Passed - Patient is age 6 or older        Passed - Recent (12 mo) or future (30 days) visit within the authorizing provider's specialty     Patient has had an office visit with the authorizing provider or a provider within the authorizing providers department within the previous 12 mos or has a future within next 30 days. See \"Patient Info\" tab in inbasket, or \"Choose Columns\" in Meds & Orders section of the refill encounter.              Passed - Medication is active on med list      Last Written Prescription Date:  4/2/2021  Last Fill Quantity: 180,  # refills: 3   Last office visit provider:  4/2/2021      metFORMIN (GLUCOPHAGE) 1000 MG tablet [Pharmacy Med Name: metFORMIN HCl 1000 MG Oral Tablet] 180 tablet 3     Sig: TAKE 1 TABLET BY MOUTH  TWICE DAILY WITH MEALS       Biguanide Agents Failed - 1/18/2022  4:40 AM        Failed - Patient has documented A1c within the specified period of time.     If HgbA1C is 8 or greater, it needs to be on file within the past 3 months.  If less than 8, must be on file within the past 6 months.     Recent Labs   Lab Test 04/02/21  0815   A1C 7.2*             Failed - Patient's CR is NOT>1.4 OR Patient's EGFR is NOT<45 within past 12 mos.     Recent Labs   Lab Test 12/18/20  0753   GFRESTIMATED >60   GFRESTBLACK >60       Recent Labs   Lab Test 12/18/20  0753   CR 0.98             Failed - Recent (6 mo) or future (30 days) visit within the authorizing provider's specialty     Patient had office visit in the last 6 months or has a visit in the next 30 days with authorizing provider or within the authorizing provider's specialty.  See \"Patient Info\" tab in inbasket, or \"Choose Columns\" in Meds & Orders section of the refill encounter.            Passed - Patient is age 10 or older        Passed - Patient does NOT have a diagnosis of CHF.        Passed - Medication is active on med list      Last Written Prescription Date:  " "4/2/2021  Last Fill Quantity: 90,  # refills: 3   Last office visit provider:  4/2/2021      atorvastatin (LIPITOR) 10 MG tablet [Pharmacy Med Name: Atorvastatin Calcium 10 MG Oral Tablet] 90 tablet 3     Sig: TAKE 1 TABLET BY MOUTH  DAILY       Statins Protocol Failed - 1/18/2022  4:40 AM        Failed - LDL on file in past 12 months     Recent Labs   Lab Test 12/18/20  0753   LDL 83             Passed - No abnormal creatine kinase in past 12 months     No lab results found.             Passed - Recent (12 mo) or future (30 days) visit within the authorizing provider's specialty     Patient has had an office visit with the authorizing provider or a provider within the authorizing providers department within the previous 12 mos or has a future within next 30 days. See \"Patient Info\" tab in inbasket, or \"Choose Columns\" in Meds & Orders section of the refill encounter.              Passed - Medication is active on med list        Passed - Patient is age 18 or older             Lynnette Bruk RN 01/20/22 8:24 AM  "

## 2022-01-21 DIAGNOSIS — Z79.4 TYPE 2 DIABETES MELLITUS WITH COMPLICATION, WITH LONG-TERM CURRENT USE OF INSULIN (H): ICD-10-CM

## 2022-01-21 DIAGNOSIS — E11.8 TYPE 2 DIABETES MELLITUS WITH COMPLICATION, WITH LONG-TERM CURRENT USE OF INSULIN (H): ICD-10-CM

## 2022-01-24 NOTE — TELEPHONE ENCOUNTER
"Routing refill request to provider for review/approval because:  Labs not current:  Multiple    Last Written Prescription Date:  4/8/21  Last Fill Quantity: 80 ml,  # refills: 3   Last office visit provider:  4/2/21     Requested Prescriptions   Pending Prescriptions Disp Refills     LANTUS SOLOSTAR 100 UNIT/ML soln [Pharmacy Med Name: Lantus SoloStar 100 UNIT/ML Subcutaneous Solution Pen-injector] 90 mL      Sig: INJECT SUBCUTANEOUSLY 30  UNITS IN THE MORNING AND 60 UNITS AT BEDTIME       Long Acting Insulin Protocol Failed - 1/21/2022  9:07 PM        Failed - Serum creatinine on file in past 12 months     Recent Labs   Lab Test 12/18/20  0753   CR 0.98       Ok to refill medication if creatinine is low          Failed - HgbA1C in past 3 or 6 months     If HgbA1C is 8 or greater, it needs to be on file within the past 3 months.  If less than 8, must be on file within the past 6 months.     Recent Labs   Lab Test 04/02/21  0815   A1C 7.2*             Failed - Recent (6 mo) or future (30 days) visit within the authorizing provider's specialty     Patient had office visit in the last 6 months or has a visit in the next 30 days with authorizing provider or within the authorizing provider's specialty.  See \"Patient Info\" tab in inbasket, or \"Choose Columns\" in Meds & Orders section of the refill encounter.            Passed - Medication is active on med list        Passed - Patient is age 18 or older             Jony Nuno RN 01/24/22 1:33 PM  "

## 2022-01-25 RX ORDER — INSULIN GLARGINE 100 [IU]/ML
INJECTION, SOLUTION SUBCUTANEOUS
Qty: 90 ML | Refills: 0 | Status: SHIPPED | OUTPATIENT
Start: 2022-01-25 | End: 2022-03-01

## 2022-03-01 ENCOUNTER — OFFICE VISIT (OUTPATIENT)
Dept: FAMILY MEDICINE | Facility: CLINIC | Age: 67
End: 2022-03-01
Payer: COMMERCIAL

## 2022-03-01 VITALS
HEART RATE: 60 BPM | HEIGHT: 72 IN | BODY MASS INDEX: 38.34 KG/M2 | SYSTOLIC BLOOD PRESSURE: 146 MMHG | WEIGHT: 283.06 LBS | DIASTOLIC BLOOD PRESSURE: 76 MMHG | TEMPERATURE: 97.7 F | RESPIRATION RATE: 16 BRPM

## 2022-03-01 DIAGNOSIS — E11.8 TYPE 2 DIABETES MELLITUS WITH COMPLICATION, WITH LONG-TERM CURRENT USE OF INSULIN (H): Primary | ICD-10-CM

## 2022-03-01 DIAGNOSIS — I10 ESSENTIAL HYPERTENSION: ICD-10-CM

## 2022-03-01 DIAGNOSIS — E11.42 DIABETIC POLYNEUROPATHY ASSOCIATED WITH TYPE 2 DIABETES MELLITUS (H): ICD-10-CM

## 2022-03-01 DIAGNOSIS — E66.01 MORBID OBESITY (H): ICD-10-CM

## 2022-03-01 DIAGNOSIS — Z79.4 TYPE 2 DIABETES MELLITUS WITH COMPLICATION, WITH LONG-TERM CURRENT USE OF INSULIN (H): Primary | ICD-10-CM

## 2022-03-01 DIAGNOSIS — E78.5 HYPERLIPIDEMIA WITH TARGET LDL LESS THAN 100: ICD-10-CM

## 2022-03-01 PROBLEM — L98.9 SKIN LESION: Status: RESOLVED | Noted: 2021-04-02 | Resolved: 2022-03-01

## 2022-03-01 PROBLEM — R80.9 MICROALBUMINURIA: Status: ACTIVE | Noted: 2019-06-03

## 2022-03-01 LAB — HBA1C MFR BLD: 7.8 % (ref 0–5.6)

## 2022-03-01 PROCEDURE — 83036 HEMOGLOBIN GLYCOSYLATED A1C: CPT | Performed by: FAMILY MEDICINE

## 2022-03-01 PROCEDURE — 80048 BASIC METABOLIC PNL TOTAL CA: CPT | Performed by: FAMILY MEDICINE

## 2022-03-01 PROCEDURE — 36415 COLL VENOUS BLD VENIPUNCTURE: CPT | Performed by: FAMILY MEDICINE

## 2022-03-01 PROCEDURE — 84450 TRANSFERASE (AST) (SGOT): CPT | Performed by: FAMILY MEDICINE

## 2022-03-01 PROCEDURE — 84460 ALANINE AMINO (ALT) (SGPT): CPT | Performed by: FAMILY MEDICINE

## 2022-03-01 PROCEDURE — 99214 OFFICE O/P EST MOD 30 MIN: CPT | Performed by: FAMILY MEDICINE

## 2022-03-01 NOTE — PATIENT INSTRUCTIONS
1. Continue current medications.  2. Follow up for diabetes in 6 months.  3. We will notify you of lab results.

## 2022-03-01 NOTE — PROGRESS NOTES
Assessment/Plan:      Problem List Items Addressed This Visit        Medium    Type 2 diabetes mellitus with complication, with long-term current use of insulin (H) - Primary     Lab Results   Component Value Date    A1C 7.8 03/01/2022    A1C 7.2 04/02/2021    A1C 7.8 12/18/2020    A1C 7.7 07/10/2020    A1C 8.2 12/27/2019     His A1C is up from previous but still under goal of 8.0. He has been less active over the winter months and is committed to improving that. He is reluctant to increase his medications and no change was made today. He will have new insurance in July and will plan to follow up for recheck at that time.         Relevant Medications    aspirin (ASA) 81 MG chewable tablet    B-D U/F insulin pen needle    blood glucose (ONETOUCH ULTRA) test strip    empagliflozin (JARDIANCE) 10 MG TABS tablet    insulin glargine (LANTUS SOLOSTAR) 100 UNIT/ML pen    metFORMIN (GLUCOPHAGE) 1000 MG tablet    Other Relevant Orders    Hemoglobin A1c (Completed)    Essential hypertension     Blood pressure is slightly high today but he hasn't taken his medications yet due to fasting. He will increase his exercise and remain on current medications. BMP drawn today.         Relevant Medications    amLODIPine (NORVASC) 10 MG tablet    metoprolol succinate ER (TOPROL-XL) 50 MG 24 hr tablet    valsartan-hydrochlorothiazide (DIOVAN HCT) 320-25 MG tablet    Other Relevant Orders    Basic metabolic panel    Hyperlipidemia with target LDL less than 100     Patient declines lipid panel today due to cost. AST/ALT drawn. Continue statin.         Relevant Medications    atorvastatin (LIPITOR) 10 MG tablet    Other Relevant Orders    ALT    AST    Diabetic polyneuropathy associated with type 2 diabetes mellitus (H)     Excellent peripheral pulses. Skin is in good shape. We reviewed the importance of checking his feet daily given neuropathy.         Relevant Medications    blood glucose (ONETOUCH ULTRA) test strip    empagliflozin  (JARDIANCE) 10 MG TABS tablet    insulin glargine (LANTUS SOLOSTAR) 100 UNIT/ML pen    metFORMIN (GLUCOPHAGE) 1000 MG tablet    Obesity (BMI 35.0-39.9) with comorbidity (H)    Relevant Medications    empagliflozin (JARDIANCE) 10 MG TABS tablet    insulin glargine (LANTUS SOLOSTAR) 100 UNIT/ML pen    metFORMIN (GLUCOPHAGE) 1000 MG tablet        Patient Instructions   1. Continue current medications.  2. Follow up for diabetes in 6 months.  3. We will notify you of lab results.         Subjective:   Artem Pryor is a 66 year old person who presents today for recheck on his diabetes. He reports this morning's blood sugar was 105. On average he reports they are 140 fasting in the morning. He reports he isn't really having any significant highs or lows. He has been significantly less active over the winter and is planning to work on improving that.    No other concerns today. He didn't take his blood pressure pill yet this morning. He doesn't check his pressure at home consistently but does take his pills consistently.    Patient Active Problem List   Diagnosis     Type 2 diabetes mellitus with complication, with long-term current use of insulin (H)     Essential hypertension     Hyperlipidemia with target LDL less than 100     Microalbuminuria     Vitamin D deficiency     Anemia of chronic disease     Diabetic polyneuropathy associated with type 2 diabetes mellitus (H)     Monocytosis     Mild nonproliferative diabetic retinopathy (H)     Obesity (BMI 35.0-39.9) with comorbidity (H)      Past Medical History:   Diagnosis Date     Diabetes mellitus (H)      Hypertension      Lyme disease      History reviewed. No pertinent surgical history.    Review of System: Relevant items noted in HPI. ROS otherwise negative.     Objective:     Vitals:    03/01/22 0924   BP: (!) 146/76   BP Location: Left arm   Patient Position: Sitting   Cuff Size: Adult Large   Pulse: 60   Resp: 16   Temp: 97.7  F (36.5  C)   TempSrc: Oral  "  Weight: 128.4 kg (283 lb 1 oz)   Height: 1.836 m (6' 0.3\")        Physical Exam  Constitutional:       General: He is not in acute distress.     Appearance: He is not ill-appearing.   HENT:      Right Ear: Tympanic membrane and ear canal normal.      Left Ear: Tympanic membrane and ear canal normal.   Cardiovascular:      Rate and Rhythm: Normal rate and regular rhythm.      Pulses:           Dorsalis pedis pulses are 3+ on the right side and 3+ on the left side.      Heart sounds: No murmur heard.  Pulmonary:      Effort: Pulmonary effort is normal.      Breath sounds: Normal breath sounds. No wheezing or rhonchi.   Abdominal:      General: Abdomen is flat. There is no distension.      Tenderness: There is no abdominal tenderness. There is no guarding.   Musculoskeletal:      Cervical back: Neck supple.      Right lower leg: No edema.      Left lower leg: No edema.   Feet:      Right foot:      Protective Sensation: 10 sites tested. 6 sites sensed.      Skin integrity: Skin integrity normal.      Left foot:      Protective Sensation: 10 sites tested. 6 sites sensed.      Skin integrity: Skin integrity normal.   Lymphadenopathy:      Cervical: No cervical adenopathy.          Answers for HPI/ROS submitted by the patient on 2/22/2022  Frequency of checking blood sugars:: three times daily  What time of day are you checking your blood sugars : before and after meals, at bedtime  Have you had any blood sugars above 200?: Yes  Have you had any blood sugars below 70?: No  Hypoglycemia symptoms:: blurred vision  Diabetic concerns:: other  Paraesthesia present:: numbness in feet  Have you had a diabetic eye exam within the last year?: Yes  Date of last eye exam:: October 2021  Where was this eye exam done?: [facility]  On average, how many sweetened beverages do you drink each day (Examples: soda, juice, sweet tea, etc.  Do NOT count diet or artificially sweetened beverages)?: 0  How many minutes a day do you exercise " enough to make your heart beat faster?: 10 to 19  How many days a week do you exercise enough to make your heart beat faster?: 4  How many days per week do you miss taking your medication?: 0

## 2022-03-01 NOTE — ASSESSMENT & PLAN NOTE
>>ASSESSMENT AND PLAN FOR DIABETIC POLYNEUROPATHY ASSOCIATED WITH TYPE 2 DIABETES MELLITUS (H) WRITTEN ON 3/1/2022 12:17 PM BY FREEDOM LEYVA MD    Excellent peripheral pulses. Skin is in good shape. We reviewed the importance of checking his feet daily given neuropathy.    >>ASSESSMENT AND PLAN FOR TYPE 2 DIABETES MELLITUS WITH COMPLICATION, WITH LONG-TERM CURRENT USE OF INSULIN (H) WRITTEN ON 3/2/2022  1:57 AM BY FREEDOM LEYVA MD    Lab Results   Component Value Date    A1C 7.8 03/01/2022    A1C 7.2 04/02/2021    A1C 7.8 12/18/2020    A1C 7.7 07/10/2020    A1C 8.2 12/27/2019     His A1C is up from previous but still under goal of 8.0. He has been less active over the winter months and is committed to improving that. He is reluctant to increase his medications and no change was made today. He will have new insurance in July and will plan to follow up for recheck at that time.

## 2022-03-02 LAB
ALT SERPL W P-5'-P-CCNC: 47 U/L (ref 0–45)
ANION GAP SERPL CALCULATED.3IONS-SCNC: 18 MMOL/L (ref 5–18)
AST SERPL W P-5'-P-CCNC: 31 U/L (ref 0–40)
BUN SERPL-MCNC: 16 MG/DL (ref 8–22)
CALCIUM SERPL-MCNC: 9.5 MG/DL (ref 8.5–10.5)
CHLORIDE BLD-SCNC: 102 MMOL/L (ref 98–107)
CO2 SERPL-SCNC: 21 MMOL/L (ref 22–31)
CREAT SERPL-MCNC: 0.95 MG/DL (ref 0.7–1.3)
GFR SERPL CREATININE-BSD FRML MDRD: 89 ML/MIN/1.73M2
GLUCOSE BLD-MCNC: 142 MG/DL (ref 70–125)
POTASSIUM BLD-SCNC: 4 MMOL/L (ref 3.5–5)
SODIUM SERPL-SCNC: 141 MMOL/L (ref 136–145)

## 2022-03-02 RX ORDER — AMLODIPINE BESYLATE 10 MG/1
10 TABLET ORAL DAILY
Qty: 90 TABLET | Refills: 4 | Status: SHIPPED | OUTPATIENT
Start: 2022-03-02 | End: 2022-08-24

## 2022-03-02 RX ORDER — ASPIRIN 81 MG/1
81 TABLET, CHEWABLE ORAL DAILY
Qty: 90 TABLET | Refills: 4 | Status: SHIPPED | OUTPATIENT
Start: 2022-03-02 | End: 2024-01-31

## 2022-03-02 RX ORDER — VALSARTAN AND HYDROCHLOROTHIAZIDE 320; 25 MG/1; MG/1
1 TABLET, FILM COATED ORAL DAILY
Qty: 90 TABLET | Refills: 4 | Status: SHIPPED | OUTPATIENT
Start: 2022-03-02 | End: 2022-08-24

## 2022-03-02 RX ORDER — ATORVASTATIN CALCIUM 10 MG/1
10 TABLET, FILM COATED ORAL DAILY
Qty: 90 TABLET | Refills: 4 | Status: SHIPPED | OUTPATIENT
Start: 2022-03-02 | End: 2022-08-24

## 2022-03-02 RX ORDER — FLURBIPROFEN SODIUM 0.3 MG/ML
SOLUTION/ DROPS OPHTHALMIC
Qty: 450 EACH | Refills: 4 | Status: SHIPPED | OUTPATIENT
Start: 2022-03-02 | End: 2022-03-09

## 2022-03-02 RX ORDER — METOPROLOL SUCCINATE 50 MG/1
50 TABLET, EXTENDED RELEASE ORAL DAILY
Qty: 90 TABLET | Refills: 4 | Status: SHIPPED | OUTPATIENT
Start: 2022-03-02 | End: 2022-08-24

## 2022-03-02 RX ORDER — INSULIN GLARGINE 100 [IU]/ML
INJECTION, SOLUTION SUBCUTANEOUS
Qty: 90 ML | Refills: 4 | Status: SHIPPED | OUTPATIENT
Start: 2022-03-02 | End: 2022-08-24

## 2022-03-02 NOTE — ASSESSMENT & PLAN NOTE
Blood pressure is slightly high today but he hasn't taken his medications yet due to fasting. He will increase his exercise and remain on current medications. BMP drawn today.

## 2022-03-02 NOTE — ASSESSMENT & PLAN NOTE
Lab Results   Component Value Date    A1C 7.8 03/01/2022    A1C 7.2 04/02/2021    A1C 7.8 12/18/2020    A1C 7.7 07/10/2020    A1C 8.2 12/27/2019     His A1C is up from previous but still under goal of 8.0. He has been less active over the winter months and is committed to improving that. He is reluctant to increase his medications and no change was made today. He will have new insurance in July and will plan to follow up for recheck at that time.

## 2022-03-09 ENCOUNTER — MYC MEDICAL ADVICE (OUTPATIENT)
Dept: FAMILY MEDICINE | Facility: CLINIC | Age: 67
End: 2022-03-09
Payer: COMMERCIAL

## 2022-03-09 DIAGNOSIS — Z79.4 TYPE 2 DIABETES MELLITUS WITH COMPLICATION, WITH LONG-TERM CURRENT USE OF INSULIN (H): ICD-10-CM

## 2022-03-09 DIAGNOSIS — E11.8 TYPE 2 DIABETES MELLITUS WITH COMPLICATION, WITH LONG-TERM CURRENT USE OF INSULIN (H): ICD-10-CM

## 2022-03-09 RX ORDER — FLURBIPROFEN SODIUM 0.3 MG/ML
SOLUTION/ DROPS OPHTHALMIC
Qty: 450 EACH | Refills: 4 | Status: SHIPPED | OUTPATIENT
Start: 2022-03-09 | End: 2022-08-24

## 2022-05-22 ENCOUNTER — HEALTH MAINTENANCE LETTER (OUTPATIENT)
Age: 67
End: 2022-05-22

## 2022-05-24 ENCOUNTER — TELEPHONE (OUTPATIENT)
Dept: FAMILY MEDICINE | Facility: CLINIC | Age: 67
End: 2022-05-24
Payer: COMMERCIAL

## 2022-05-24 DIAGNOSIS — Z79.4 TYPE 2 DIABETES MELLITUS WITH COMPLICATION, WITH LONG-TERM CURRENT USE OF INSULIN (H): Primary | ICD-10-CM

## 2022-05-24 DIAGNOSIS — E11.8 TYPE 2 DIABETES MELLITUS WITH COMPLICATION, WITH LONG-TERM CURRENT USE OF INSULIN (H): Primary | ICD-10-CM

## 2022-05-24 NOTE — TELEPHONE ENCOUNTER
Reason for Call: patient is calling for a refill of    insulin lispro (HUMALOG KWIKPEN INSULIN) 100 unit/mL pen     SEND TO [facility]    Detailed comments: LAST SEEN 3/1/22. Patient will be calling back to [facility] after his insurance change in July.    Phone Number Patient can be reached at: Home number on file [phone] (home)    Best Time: any    Can we leave a detailed message on this number? YES    Call taken on 5/24/2022 at 8:30 AM by Emely Christianson

## 2022-05-31 RX ORDER — INSULIN LISPRO 100 [IU]/ML
INJECTION, SOLUTION INTRAVENOUS; SUBCUTANEOUS
Qty: 15 ML | Refills: 5 | Status: CANCELLED | OUTPATIENT
Start: 2022-05-31

## 2022-05-31 RX ORDER — INSULIN LISPRO 100 [IU]/ML
INJECTION, SOLUTION INTRAVENOUS; SUBCUTANEOUS
Qty: 15 ML | Refills: 5 | Status: SHIPPED | OUTPATIENT
Start: 2022-05-31 | End: 2022-08-24

## 2022-05-31 NOTE — TELEPHONE ENCOUNTER
Patient calling for update on refill request.     Patient of Dr. Seaman, sent to covering provider, Dr. Lal.

## 2022-08-24 ENCOUNTER — OFFICE VISIT (OUTPATIENT)
Dept: FAMILY MEDICINE | Facility: CLINIC | Age: 67
End: 2022-08-24
Payer: COMMERCIAL

## 2022-08-24 VITALS
SYSTOLIC BLOOD PRESSURE: 164 MMHG | BODY MASS INDEX: 35.81 KG/M2 | RESPIRATION RATE: 16 BRPM | WEIGHT: 264.4 LBS | HEART RATE: 60 BPM | OXYGEN SATURATION: 99 % | HEIGHT: 72 IN | DIASTOLIC BLOOD PRESSURE: 64 MMHG

## 2022-08-24 DIAGNOSIS — E78.5 HYPERLIPIDEMIA WITH TARGET LDL LESS THAN 100: ICD-10-CM

## 2022-08-24 DIAGNOSIS — E11.8 TYPE 2 DIABETES MELLITUS WITH COMPLICATION, WITH LONG-TERM CURRENT USE OF INSULIN (H): Primary | ICD-10-CM

## 2022-08-24 DIAGNOSIS — Z13.220 SCREENING FOR HYPERLIPIDEMIA: ICD-10-CM

## 2022-08-24 DIAGNOSIS — Z79.4 TYPE 2 DIABETES MELLITUS WITH COMPLICATION, WITH LONG-TERM CURRENT USE OF INSULIN (H): Primary | ICD-10-CM

## 2022-08-24 DIAGNOSIS — Z51.81 ENCOUNTER FOR THERAPEUTIC DRUG MONITORING: ICD-10-CM

## 2022-08-24 DIAGNOSIS — D63.8 ANEMIA OF CHRONIC DISEASE: ICD-10-CM

## 2022-08-24 DIAGNOSIS — N50.811 RIGHT TESTICULAR PAIN: ICD-10-CM

## 2022-08-24 DIAGNOSIS — E55.9 VITAMIN D DEFICIENCY: ICD-10-CM

## 2022-08-24 DIAGNOSIS — E11.42 DIABETIC POLYNEUROPATHY ASSOCIATED WITH TYPE 2 DIABETES MELLITUS (H): ICD-10-CM

## 2022-08-24 DIAGNOSIS — Z12.5 SCREENING PSA (PROSTATE SPECIFIC ANTIGEN): ICD-10-CM

## 2022-08-24 DIAGNOSIS — E66.812 CLASS 2 SEVERE OBESITY WITH SERIOUS COMORBIDITY AND BODY MASS INDEX (BMI) OF 35.0 TO 35.9 IN ADULT, UNSPECIFIED OBESITY TYPE (H): ICD-10-CM

## 2022-08-24 DIAGNOSIS — E66.01 CLASS 2 SEVERE OBESITY WITH SERIOUS COMORBIDITY AND BODY MASS INDEX (BMI) OF 35.0 TO 35.9 IN ADULT, UNSPECIFIED OBESITY TYPE (H): ICD-10-CM

## 2022-08-24 DIAGNOSIS — N44.8 ACQUIRED ASYMMETRY IN TESTICULAR SIZE: ICD-10-CM

## 2022-08-24 DIAGNOSIS — Z00.00 ENCOUNTER FOR PREVENTIVE CARE: ICD-10-CM

## 2022-08-24 DIAGNOSIS — I10 ESSENTIAL HYPERTENSION: ICD-10-CM

## 2022-08-24 LAB
ALBUMIN SERPL BCG-MCNC: 4.3 G/DL (ref 3.5–5.2)
ALP SERPL-CCNC: 101 U/L (ref 40–129)
ALT SERPL W P-5'-P-CCNC: 33 U/L (ref 10–50)
ANION GAP SERPL CALCULATED.3IONS-SCNC: 14 MMOL/L (ref 7–15)
AST SERPL W P-5'-P-CCNC: 23 U/L (ref 10–50)
BILIRUB SERPL-MCNC: 0.4 MG/DL
BUN SERPL-MCNC: 21.4 MG/DL (ref 8–23)
CALCIUM SERPL-MCNC: 9.6 MG/DL (ref 8.8–10.2)
CHLORIDE SERPL-SCNC: 102 MMOL/L (ref 98–107)
CHOLEST SERPL-MCNC: 116 MG/DL
CREAT SERPL-MCNC: 1.03 MG/DL (ref 0.67–1.17)
CREAT UR-MCNC: 184 MG/DL
DEPRECATED HCO3 PLAS-SCNC: 24 MMOL/L (ref 22–29)
ERYTHROCYTE [DISTWIDTH] IN BLOOD BY AUTOMATED COUNT: 14.3 % (ref 10–15)
GFR SERPL CREATININE-BSD FRML MDRD: 81 ML/MIN/1.73M2
GLUCOSE SERPL-MCNC: 71 MG/DL (ref 70–99)
HBA1C MFR BLD: 6.4 % (ref 0–5.6)
HCT VFR BLD AUTO: 41.3 % (ref 40–53)
HDLC SERPL-MCNC: 39 MG/DL
HGB BLD-MCNC: 13.4 G/DL (ref 13.3–17.7)
HOLD SPECIMEN: NORMAL
LDLC SERPL CALC-MCNC: 61 MG/DL
MCH RBC QN AUTO: 26.4 PG (ref 26.5–33)
MCHC RBC AUTO-ENTMCNC: 32.4 G/DL (ref 31.5–36.5)
MCV RBC AUTO: 82 FL (ref 78–100)
MICROALBUMIN UR-MCNC: 53.5 MG/L
MICROALBUMIN/CREAT UR: 29.08 MG/G CR (ref 0–17)
NONHDLC SERPL-MCNC: 77 MG/DL
PLATELET # BLD AUTO: 284 10E3/UL (ref 150–450)
POTASSIUM SERPL-SCNC: 4.1 MMOL/L (ref 3.4–5.3)
PROT SERPL-MCNC: 7 G/DL (ref 6.4–8.3)
PSA SERPL-MCNC: 0.79 NG/ML (ref 0–4.5)
RBC # BLD AUTO: 5.07 10E6/UL (ref 4.4–5.9)
SODIUM SERPL-SCNC: 140 MMOL/L (ref 136–145)
TRIGL SERPL-MCNC: 79 MG/DL
WBC # BLD AUTO: 8.6 10E3/UL (ref 4–11)

## 2022-08-24 PROCEDURE — 36415 COLL VENOUS BLD VENIPUNCTURE: CPT | Performed by: FAMILY MEDICINE

## 2022-08-24 PROCEDURE — 82306 VITAMIN D 25 HYDROXY: CPT | Performed by: FAMILY MEDICINE

## 2022-08-24 PROCEDURE — 82043 UR ALBUMIN QUANTITATIVE: CPT | Performed by: FAMILY MEDICINE

## 2022-08-24 PROCEDURE — 80053 COMPREHEN METABOLIC PANEL: CPT | Performed by: FAMILY MEDICINE

## 2022-08-24 PROCEDURE — 99214 OFFICE O/P EST MOD 30 MIN: CPT | Performed by: FAMILY MEDICINE

## 2022-08-24 PROCEDURE — 83036 HEMOGLOBIN GLYCOSYLATED A1C: CPT | Performed by: FAMILY MEDICINE

## 2022-08-24 PROCEDURE — 80061 LIPID PANEL: CPT | Performed by: FAMILY MEDICINE

## 2022-08-24 PROCEDURE — G0103 PSA SCREENING: HCPCS | Performed by: FAMILY MEDICINE

## 2022-08-24 PROCEDURE — 85027 COMPLETE CBC AUTOMATED: CPT | Performed by: FAMILY MEDICINE

## 2022-08-24 RX ORDER — METOPROLOL SUCCINATE 50 MG/1
50 TABLET, EXTENDED RELEASE ORAL DAILY
Qty: 90 TABLET | Refills: 4 | Status: SHIPPED | OUTPATIENT
Start: 2022-08-24 | End: 2023-11-02

## 2022-08-24 RX ORDER — AMLODIPINE BESYLATE 10 MG/1
10 TABLET ORAL DAILY
Qty: 90 TABLET | Refills: 4 | Status: SHIPPED | OUTPATIENT
Start: 2022-08-24 | End: 2023-11-02

## 2022-08-24 RX ORDER — FLURBIPROFEN SODIUM 0.3 MG/ML
SOLUTION/ DROPS OPHTHALMIC
Qty: 450 EACH | Refills: 4 | Status: SHIPPED | OUTPATIENT
Start: 2022-08-24 | End: 2023-12-20

## 2022-08-24 RX ORDER — ATORVASTATIN CALCIUM 10 MG/1
10 TABLET, FILM COATED ORAL DAILY
Qty: 90 TABLET | Refills: 4 | Status: SHIPPED | OUTPATIENT
Start: 2022-08-24 | End: 2023-11-02

## 2022-08-24 RX ORDER — INSULIN LISPRO 100 [IU]/ML
INJECTION, SOLUTION INTRAVENOUS; SUBCUTANEOUS
Qty: 15 ML | Refills: 5 | Status: SHIPPED | OUTPATIENT
Start: 2022-08-24 | End: 2023-01-02

## 2022-08-24 RX ORDER — VALSARTAN AND HYDROCHLOROTHIAZIDE 320; 25 MG/1; MG/1
1 TABLET, FILM COATED ORAL DAILY
Qty: 90 TABLET | Refills: 4 | Status: SHIPPED | OUTPATIENT
Start: 2022-08-24 | End: 2023-11-02

## 2022-08-24 RX ORDER — INSULIN GLARGINE 100 [IU]/ML
INJECTION, SOLUTION SUBCUTANEOUS
Qty: 90 ML | Refills: 4 | Status: SHIPPED | OUTPATIENT
Start: 2022-08-24 | End: 2023-11-01

## 2022-08-24 NOTE — ASSESSMENT & PLAN NOTE
Controlled on current regimen.  Continue metformin 1000 mg p.o. twice daily, Humalog sliding scale insulin, Lantus 30 units every morning and 60 units nightly and Jardiance 10 mg p.o. daily.  In addition he uses aspirin, statin and ARB as his gold standard for diabetics  A1c is 6.4 today, which is at goal, under 7.  Plan follow-up in 3 months.

## 2022-08-24 NOTE — ASSESSMENT & PLAN NOTE
Uncontrolled hypertension.  Blood pressure was 142/62.  Continue current medications which include Diovan 320/25/day, Toprol-XL 50 mg per orally per day, and Norvasc 10 mg orally per day.    CMP today to monitor the drugs.  Discussed increasing medication.  Plan to monitor and follow-up in 1-3 months with values and make changes at that time.  He is new to me today and feeling anxious.  This may be the explanation for his elevated blood pressure.

## 2022-08-24 NOTE — PROGRESS NOTES
Problem List Items Addressed This Visit        Nervous and Auditory    Diabetic polyneuropathy associated with type 2 diabetes mellitus (H)     See diabetes in the problem list for more detail           Relevant Medications    empagliflozin (JARDIANCE) 10 MG TABS tablet    insulin glargine (LANTUS SOLOSTAR) 100 UNIT/ML pen    insulin lispro (HUMALOG KWIKPEN) 100 UNIT/ML (1 unit dial) KWIKPEN    metFORMIN (GLUCOPHAGE) 1000 MG tablet       Digestive    Vitamin D deficiency     We will check vitamin D level today and titrate supplement as needed.           Relevant Orders    Vitamin D Deficiency    Class 2 severe obesity with serious comorbidity and body mass index (BMI) of 35.0 to 35.9 in adult, unspecified obesity type (H)     Severe obesity with BMI 35.86 and chronic comorbid conditions including diabetes, hypertension, microalbuminuria and hyperlipidemia    Weight reduction is strongly recommended.  Physician assisted weight loss or weight loss surgery is offered.           Relevant Medications    empagliflozin (JARDIANCE) 10 MG TABS tablet    insulin glargine (LANTUS SOLOSTAR) 100 UNIT/ML pen    insulin lispro (HUMALOG KWIKPEN) 100 UNIT/ML (1 unit dial) KWIKPEN    metFORMIN (GLUCOPHAGE) 1000 MG tablet       Endocrine    Type 2 diabetes mellitus with complication, with long-term current use of insulin (H) - Primary     Controlled on current regimen.  Continue metformin 1000 mg p.o. twice daily, Humalog sliding scale insulin, Lantus 30 units every morning and 60 units nightly and Jardiance 10 mg p.o. daily.  In addition he uses aspirin, statin and ARB as his gold standard for diabetics  A1c is 6.4 today, which is at goal, under 7.  Plan follow-up in 3 months.           Relevant Medications    empagliflozin (JARDIANCE) 10 MG TABS tablet    insulin glargine (LANTUS SOLOSTAR) 100 UNIT/ML pen    insulin lispro (HUMALOG KWIKPEN) 100 UNIT/ML (1 unit dial) KWIKPEN    metFORMIN (GLUCOPHAGE) 1000 MG tablet    B-D U/F  insulin pen needle    Other Relevant Orders    Albumin Random Urine Quantitative with Creat Ratio    HEMOGLOBIN A1C (Completed)    Hyperlipidemia with target LDL less than 100    Relevant Medications    atorvastatin (LIPITOR) 10 MG tablet    Other Relevant Orders    Lipid Profile       Circulatory    Essential hypertension     Uncontrolled hypertension.  Blood pressure was 142/62.  Continue current medications which include Diovan 320/25/day, Toprol-XL 50 mg per orally per day, and Norvasc 10 mg orally per day.    CMP today to monitor the drugs.  Discussed increasing medication.  Plan to monitor and follow-up in 1-3 months with values and make changes at that time.  He is new to me today and feeling anxious.  This may be the explanation for his elevated blood pressure.           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   Urology consult also ordered.            Relevant Orders    Adult Urology  Referral    US Testicular & Scrotum w Doppler Ltd       Hematologic    Anemia of chronic disease     CBC is checked again today.           Relevant Orders    CBC with platelets      Other Visit Diagnoses     Screening for hyperlipidemia        Encounter for therapeutic drug monitoring        Relevant Orders    Comprehensive metabolic panel (BMP + Alb, Alk Phos, ALT, AST, Total. Bili, TP)    Encounter for preventive care        Screening PSA (prostate specific antigen)        Relevant Orders    PSA, screen    Right testicular pain         Relevant Orders    US Testicular & Scrotum w Doppler Ltd           Parul Mcgill is a 67 year old, presenting for the following health issues:  Establish Care and Diabetes (Follow up)      History of Present Illness       Diabetes:    He presents for follow up of diabetes.  He is checking home blood glucose three times daily. He checks blood glucose before and after meals and at bedtime.  Blood glucose is never over 200 and sometimes under 70. He is aware of hypoglycemia symptoms including shakiness and blurred vision. He has no concerns regarding his diabetes at this time.  He is having numbness in feet.         He eats 2-3 servings of fruits and vegetables daily.He consumes 0 sweetened beverage(s) daily.He exercises with enough effort to increase his heart rate 30 to 60 minutes per day.  He exercises with enough effort to increase his heart rate 5 days per week.   He is taking medications regularly.           Objective    BP (!) 164/64 (BP Location: Left arm, Patient Position: Sitting, Cuff Size: Adult Large)   Pulse 60   Resp 16   Ht 1.829 m (6')   Wt 119.9 kg (264 lb 6.4 oz)   SpO2 99%   BMI 35.86 kg/m    Body mass index is 35.86 kg/m .  Physical Exam  Constitutional:       Appearance: Normal appearance.   HENT:      Head: Normocephalic and atraumatic.   Cardiovascular:      Rate and Rhythm: Normal rate and regular rhythm.   Pulmonary:      Effort: Pulmonary effort is normal.   Abdominal:      Hernia: There is no hernia in the left inguinal area or right inguinal area.   Genitourinary:     Penis: Normal and circumcised.       Testes: Normal.      Epididymis:      Right: Normal.      Left: Normal.   Musculoskeletal:         General: Normal range of motion.      Cervical back: Normal range of motion and neck supple.   Lymphadenopathy:      Lower Body: No right inguinal adenopathy. No left inguinal adenopathy.   Neurological:      General: No focal deficit present.      Mental Status: He is alert.        ..

## 2022-08-24 NOTE — ASSESSMENT & PLAN NOTE
>>ASSESSMENT AND PLAN FOR DIABETIC POLYNEUROPATHY ASSOCIATED WITH TYPE 2 DIABETES MELLITUS (H) WRITTEN ON 8/24/2022  4:31 PM BY KANG BOBO MD    See diabetes in the problem list for more detail    >>ASSESSMENT AND PLAN FOR TYPE 2 DIABETES MELLITUS WITH COMPLICATION, WITH LONG-TERM CURRENT USE OF INSULIN (H) WRITTEN ON 8/24/2022  4:28 PM BY KANG BOBO MD    Controlled on current regimen.  Continue metformin 1000 mg p.o. twice daily, Humalog sliding scale insulin, Lantus 30 units every morning and 60 units nightly and Jardiance 10 mg p.o. daily.  In addition he uses aspirin, statin and ARB as his gold standard for diabetics  A1c is 6.4 today, which is at goal, under 7.  Plan follow-up in 3 months.

## 2022-08-24 NOTE — ASSESSMENT & PLAN NOTE
Severe obesity with BMI 35.86 and chronic comorbid conditions including diabetes, hypertension, microalbuminuria and hyperlipidemia    Weight reduction is strongly recommended.  Physician assisted weight loss or weight loss surgery is offered.

## 2022-08-25 LAB — DEPRECATED CALCIDIOL+CALCIFEROL SERPL-MC: 40 UG/L (ref 20–75)

## 2022-09-18 ENCOUNTER — HEALTH MAINTENANCE LETTER (OUTPATIENT)
Age: 67
End: 2022-09-18

## 2022-10-25 ENCOUNTER — OFFICE VISIT (OUTPATIENT)
Dept: ONCOLOGY | Facility: CLINIC | Age: 67
End: 2022-10-25
Attending: FAMILY MEDICINE
Payer: COMMERCIAL

## 2022-10-25 ENCOUNTER — HOSPITAL ENCOUNTER (OUTPATIENT)
Dept: ULTRASOUND IMAGING | Facility: CLINIC | Age: 67
Discharge: HOME | End: 2022-10-25
Attending: FAMILY MEDICINE
Payer: COMMERCIAL

## 2022-10-25 VITALS
HEIGHT: 73 IN | OXYGEN SATURATION: 97 % | RESPIRATION RATE: 18 BRPM | SYSTOLIC BLOOD PRESSURE: 146 MMHG | WEIGHT: 269.1 LBS | DIASTOLIC BLOOD PRESSURE: 70 MMHG | HEART RATE: 55 BPM | BODY MASS INDEX: 35.66 KG/M2

## 2022-10-25 DIAGNOSIS — N43.3 RIGHT HYDROCELE: Primary | ICD-10-CM

## 2022-10-25 DIAGNOSIS — N50.811 RIGHT TESTICULAR PAIN: ICD-10-CM

## 2022-10-25 DIAGNOSIS — N44.2 TESTICULAR CYST: ICD-10-CM

## 2022-10-25 DIAGNOSIS — N44.8 ACQUIRED ASYMMETRY IN TESTICULAR SIZE: ICD-10-CM

## 2022-10-25 PROCEDURE — G0463 HOSPITAL OUTPT CLINIC VISIT: HCPCS | Mod: 25

## 2022-10-25 PROCEDURE — 99204 OFFICE O/P NEW MOD 45 MIN: CPT | Performed by: STUDENT IN AN ORGANIZED HEALTH CARE EDUCATION/TRAINING PROGRAM

## 2022-10-25 PROCEDURE — 76870 US EXAM SCROTUM: CPT

## 2022-10-25 ASSESSMENT — PAIN SCALES - GENERAL: PAINLEVEL: NO PAIN (1)

## 2022-10-25 NOTE — PROGRESS NOTES
"Urology Rooming Note    October 25, 2022 8:25 AM   Artem Pryor is a 67 year old male who presents for:    Chief Complaint   Patient presents with     Urology     Elevated PSA      Initial Vitals: BP (!) 146/70   Pulse 55   Resp 18   Ht 1.854 m (6' 1\")   Wt 122.1 kg (269 lb 1.6 oz)   SpO2 97%   BMI 35.50 kg/m   Estimated body mass index is 35.5 kg/m  as calculated from the following:    Height as of this encounter: 1.854 m (6' 1\").    Weight as of this encounter: 122.1 kg (269 lb 1.6 oz). Body surface area is 2.51 meters squared.  No Pain (1) Comment: Data Unavailable     Allergies reviewed: Yes  Medications reviewed: Yes    Medications: Medication refills not needed today.  Pharmacy name entered into [facility]:    [facility] - [place], [place] - [address]  [facility] - [place], [place] - [address] [facility] - [facility], [place] - [address] [place] [address]      Agueda Stratton LPN  "

## 2022-10-25 NOTE — LETTER
"    10/25/2022         RE: Artem Pryor  [address] [facility] [place] [address]        Dear Colleague,    Thank you for referring your patient, Artem Pryor, to the [facility]. Please see a copy of my visit note below.    Urology Rooming Note    October 25, 2022 8:25 AM   Artem Pryor is a 67 year old male who presents for:    Chief Complaint   Patient presents with     Urology     Elevated PSA      Initial Vitals: BP (!) 146/70   Pulse 55   Resp 18   Ht 1.854 m (6' 1\")   Wt 122.1 kg (269 lb 1.6 oz)   SpO2 97%   BMI 35.50 kg/m   Estimated body mass index is 35.5 kg/m  as calculated from the following:    Height as of this encounter: 1.854 m (6' 1\").    Weight as of this encounter: 122.1 kg (269 lb 1.6 oz). Body surface area is 2.51 meters squared.  No Pain (1) Comment: Data Unavailable     Allergies reviewed: Yes  Medications reviewed: Yes    Medications: Medication refills not needed today.  Pharmacy name entered into [facility]:    [facility] - [place], [place] - [address]  [facility]) - [place], [place] - [address] [facility] [address]  [facility] - [address]          Chief Complaint:    Testicular swelling           Consult or Referral:     Mr. Artem Pryor is a 67 year old male seen at the request of Dr. Marroquin.         History of Present Illness:     Artem Pryor is a 67 year old male being seen for right testicular swelling.  Duration of problem: Few months  Previous treatments: None      Reviewed previous notes from Dr. Cameron Mcgill presents today for evaluation of right-sided testicular swelling  Progressive increasing swelling on the right testis for the last few months  Occasional discomfort  No pain  No history of trauma  No family history of testicular masses  Known diabetic on insulin no complications            Past Medical History:     Past " Medical History:   Diagnosis Date     Diabetes mellitus (H)      Hypertension      Lyme disease             Past Surgical History:   No past surgical history on file.         Medications     Current Outpatient Medications   Medication     amLODIPine (NORVASC) 10 MG tablet     ASCORBIC ACID, VITAMIN C, ORAL     aspirin (ASA) 81 MG chewable tablet     atorvastatin (LIPITOR) 10 MG tablet     B-D U/F insulin pen needle     blood glucose (ONETOUCH ULTRA) test strip     carica papaya (PAPAYA ENZYME) Tab     cholecalciferol, vitamin D3, 5,000 unit Tab     CYANOCOBALAMIN, VITAMIN B-12, ORAL     empagliflozin (JARDIANCE) 10 MG TABS tablet     insulin glargine (LANTUS SOLOSTAR) 100 UNIT/ML pen     insulin lispro (HUMALOG KWIKPEN) 100 UNIT/ML (1 unit dial) KWIKPEN     lancets (ONETOUCH DELICA LANCETS) 33 gauge Misc     metFORMIN (GLUCOPHAGE) 1000 MG tablet     metoprolol succinate ER (TOPROL XL) 50 MG 24 hr tablet     multivitamin-Ca-iron-minerals Tab     POTASSIUM CITRATE ORAL     valsartan-hydrochlorothiazide (DIOVAN HCT) 320-25 MG tablet     No current facility-administered medications for this visit.            Family History:     Family History   Problem Relation Age of Onset     Breast Cancer Mother 70.00     Cerebrovascular Disease Mother      Prostate Cancer Father 70.00     Melanoma Father 70.00     Heart Failure Father      Colon Cancer Maternal Grandfather 70.00     Colon Cancer Maternal Uncle 85.00     Thyroid Cancer Maternal Cousin 25.00     Cancer Brother         uncertain type     Diabetes Brother             Social History:     Social History     Socioeconomic History     Marital status:      Spouse name: Not on file     Number of children: Not on file     Years of education: Not on file     Highest education level: Not on file   Occupational History     Not on file   Tobacco Use     Smoking status: Former     Types: Cigarettes     Quit date: 1978     Years since quittin.8     Smokeless tobacco:  "Never   Substance and Sexual Activity     Alcohol use: Yes     Comment: Alcoholic Drinks/day: A few times a year     Drug use: Never     Sexual activity: Never   Other Topics Concern     Not on file   Social History Narrative     Not on file     Social Determinants of Health     Financial Resource Strain: Not on file   Food Insecurity: Not on file   Transportation Needs: Not on file   Physical Activity: Not on file   Stress: Not on file   Social Connections: Not on file   Intimate Partner Violence: Not on file   Housing Stability: Not on file            Allergies:   Lisinopril         Review of Systems:  From intake questionnaire     Skin: negative  Eyes: negative  Ears/Nose/Throat: negative  Respiratory: No shortness of breath, dyspnea on exertion, cough, or hemoptysis  Cardiovascular: No chest pain or palpitations  Gastrointestinal: negative; no nausea/vomiting, constipation or diarrhea  Genitourinary: as per HPI  Musculoskeletal: negative  Neurologic: negative  Psychiatric: negative  Hematologic/Lymphatic/Immunologic: negative  Endocrine: negative         Physical Exam:     Patient is a 67 year old  male   Vitals: Blood pressure (!) 146/70, pulse 55, resp. rate 18, height 1.854 m (6' 1\"), weight 122.1 kg (269 lb 1.6 oz), SpO2 97 %.  Constitutional: Body mass index is 35.5 kg/m .  Alert, no acute distress, oriented, conversant  Eyes: no scleral icterus; extraocular muscles intact, moist conjunctivae  Neck: trachea midline, no thyromegaly  Ears/nose/mouth: throat/mouth:normal, good dentition  Respiratory: no respiratory distress, or pursed lip breathing  Cardiovascular: pulses strong and intact; no obvious jugular venous distension present  Gastrointestinal: soft, nontender, no organomegaly or masses,   Lymphatics: No inguinal adenopathy  Musculoskeletal: extremities normal, no peripheral edema  Skin: no suspicious lesions or rashes  Neuro: Alert, oriented, speech and mentation normal  Psych: affect and mood " normal, alert and oriented to person, place and time  Gait: Normal  : Right hydrocele, normal left testis normal normal penis      Labs and Pathology:    The following labs were reviewed by me and discussed with the patient:    Significant for   Lab Results   Component Value Date    CR 1.03 08/24/2022    CR 0.95 03/01/2022    CR 0.98 12/18/2020    CR 1.05 07/10/2020    CR 1.09 12/27/2019    CR 1.02 11/15/2019     Prostate Specific Antigen Screen   Date Value Ref Range Status   08/24/2022 0.79 0.00 - 4.50 ng/mL Final   12/18/2020 0.7 0.0 - 4.5 ng/mL Final   08/16/2019 2.1 0.0 - 4.5 ng/mL Final             Imaging:    The following imaging exams were independently viewed and interpreted by me and discussed with patient:  Scrotal Ultrasound: Abnormal: I agree with the findings of the radiologist and I discussed imaging with the patient in addition I noted that the hydrocele was multi loculated  Additional findings as per radiology report  Large right hydrocele, left small intratesticular cyst             Assessment and Plan:     Right hydrocele  Multiloculated vaginal hydrocele  Discussed about management options including surgery  Currently slightly swelling is not very thick and is not causing a lot of discomfort to him  Recommended observation follow-up as needed for surgery discussion  - Adult Urology  Referral    Testicular cyst  Benign cyst on the left testis  No intervention necessary        Plan:  Follow-up as needed    Orders  No orders of the defined types were placed in this encounter.      Padilla Pan MD  [facility]      ==========================    Additional Billing and Coding Information:  Review of external notes as documented above   Review of the result(s) of each unique test - Creatinine, PSA    Independent interpretation of a test performed by another physician/other qualified health care professional (not separately reported) -   Reviewed the  ultrasound images with the patient identified additional findings not reported on the radiology    Discussion of management or test interpretation with external physician/other qualified healthcare professional/appropriate source -           15 minutes spent on the date of the encounter doing chart review, review of test results, interpretation of tests, patient visit and documentation     ==========================      Again, thank you for allowing me to participate in the care of your patient.        Sincerely,        Padilla Pan MD

## 2022-10-25 NOTE — PROGRESS NOTES
Chief Complaint:    Testicular swelling           Consult or Referral:     Mr. Artem Pryor is a 67 year old male seen at the request of Dr. Marroquin.         History of Present Illness:     Artem Pryor is a 67 year old male being seen for right testicular swelling.  Duration of problem: Few months  Previous treatments: None      Reviewed previous notes from Dr. Cameron Mcgill presents today for evaluation of right-sided testicular swelling  Progressive increasing swelling on the right testis for the last few months  Occasional discomfort  No pain  No history of trauma  No family history of testicular masses  Known diabetic on insulin no complications            Past Medical History:     Past Medical History:   Diagnosis Date     Diabetes mellitus (H)      Hypertension      Lyme disease             Past Surgical History:   No past surgical history on file.         Medications     Current Outpatient Medications   Medication     amLODIPine (NORVASC) 10 MG tablet     ASCORBIC ACID, VITAMIN C, ORAL     aspirin (ASA) 81 MG chewable tablet     atorvastatin (LIPITOR) 10 MG tablet     B-D U/F insulin pen needle     blood glucose (ONETOUCH ULTRA) test strip     carica papaya (PAPAYA ENZYME) Tab     cholecalciferol, vitamin D3, 5,000 unit Tab     CYANOCOBALAMIN, VITAMIN B-12, ORAL     empagliflozin (JARDIANCE) 10 MG TABS tablet     insulin glargine (LANTUS SOLOSTAR) 100 UNIT/ML pen     insulin lispro (HUMALOG KWIKPEN) 100 UNIT/ML (1 unit dial) KWIKPEN     lancets (ONETOUCH DELICA LANCETS) 33 gauge Misc     metFORMIN (GLUCOPHAGE) 1000 MG tablet     metoprolol succinate ER (TOPROL XL) 50 MG 24 hr tablet     multivitamin-Ca-iron-minerals Tab     POTASSIUM CITRATE ORAL     valsartan-hydrochlorothiazide (DIOVAN HCT) 320-25 MG tablet     No current facility-administered medications for this visit.            Family History:     Family History   Problem Relation Age of Onset     Breast Cancer Mother 70.00      "Cerebrovascular Disease Mother      Prostate Cancer Father 70.00     Melanoma Father 70.00     Heart Failure Father      Colon Cancer Maternal Grandfather 70.00     Colon Cancer Maternal Uncle 85.00     Thyroid Cancer Maternal Cousin 25.00     Cancer Brother         uncertain type     Diabetes Brother             Social History:     Social History     Socioeconomic History     Marital status:      Spouse name: Not on file     Number of children: Not on file     Years of education: Not on file     Highest education level: Not on file   Occupational History     Not on file   Tobacco Use     Smoking status: Former     Types: Cigarettes     Quit date: 1978     Years since quittin.8     Smokeless tobacco: Never   Substance and Sexual Activity     Alcohol use: Yes     Comment: Alcoholic Drinks/day: A few times a year     Drug use: Never     Sexual activity: Never   Other Topics Concern     Not on file   Social History Narrative     Not on file     Social Determinants of Health     Financial Resource Strain: Not on file   Food Insecurity: Not on file   Transportation Needs: Not on file   Physical Activity: Not on file   Stress: Not on file   Social Connections: Not on file   Intimate Partner Violence: Not on file   Housing Stability: Not on file            Allergies:   Lisinopril         Review of Systems:  From intake questionnaire     Skin: negative  Eyes: negative  Ears/Nose/Throat: negative  Respiratory: No shortness of breath, dyspnea on exertion, cough, or hemoptysis  Cardiovascular: No chest pain or palpitations  Gastrointestinal: negative; no nausea/vomiting, constipation or diarrhea  Genitourinary: as per HPI  Musculoskeletal: negative  Neurologic: negative  Psychiatric: negative  Hematologic/Lymphatic/Immunologic: negative  Endocrine: negative         Physical Exam:     Patient is a 67 year old  male   Vitals: Blood pressure (!) 146/70, pulse 55, resp. rate 18, height 1.854 m (6' 1\"), weight 122.1 " kg (269 lb 1.6 oz), SpO2 97 %.  Constitutional: Body mass index is 35.5 kg/m .  Alert, no acute distress, oriented, conversant  Eyes: no scleral icterus; extraocular muscles intact, moist conjunctivae  Neck: trachea midline, no thyromegaly  Ears/nose/mouth: throat/mouth:normal, good dentition  Respiratory: no respiratory distress, or pursed lip breathing  Cardiovascular: pulses strong and intact; no obvious jugular venous distension present  Gastrointestinal: soft, nontender, no organomegaly or masses,   Lymphatics: No inguinal adenopathy  Musculoskeletal: extremities normal, no peripheral edema  Skin: no suspicious lesions or rashes  Neuro: Alert, oriented, speech and mentation normal  Psych: affect and mood normal, alert and oriented to person, place and time  Gait: Normal  : Right hydrocele, normal left testis normal normal penis      Labs and Pathology:    The following labs were reviewed by me and discussed with the patient:    Significant for   Lab Results   Component Value Date    CR 1.03 08/24/2022    CR 0.95 03/01/2022    CR 0.98 12/18/2020    CR 1.05 07/10/2020    CR 1.09 12/27/2019    CR 1.02 11/15/2019     Prostate Specific Antigen Screen   Date Value Ref Range Status   08/24/2022 0.79 0.00 - 4.50 ng/mL Final   12/18/2020 0.7 0.0 - 4.5 ng/mL Final   08/16/2019 2.1 0.0 - 4.5 ng/mL Final             Imaging:    The following imaging exams were independently viewed and interpreted by me and discussed with patient:  Scrotal Ultrasound: Abnormal: I agree with the findings of the radiologist and I discussed imaging with the patient in addition I noted that the hydrocele was multi loculated  Additional findings as per radiology report  Large right hydrocele, left small intratesticular cyst             Assessment and Plan:     Right hydrocele  Multiloculated vaginal hydrocele  Discussed about management options including surgery  Currently slightly swelling is not very thick and is not causing a lot of  discomfort to him  Recommended observation follow-up as needed for surgery discussion  - Adult Urology  Referral    Testicular cyst  Benign cyst on the left testis  No intervention necessary        Plan:  Follow-up as needed    Orders  No orders of the defined types were placed in this encounter.      Padilla Pan MD  [facility]      ==========================    Additional Billing and Coding Information:  Review of external notes as documented above   Review of the result(s) of each unique test - Creatinine, PSA    Independent interpretation of a test performed by another physician/other qualified health care professional (not separately reported) -   Reviewed the ultrasound images with the patient identified additional findings not reported on the radiology    Discussion of management or test interpretation with external physician/other qualified healthcare professional/appropriate source -           15 minutes spent on the date of the encounter doing chart review, review of test results, interpretation of tests, patient visit and documentation     ==========================

## 2022-11-30 ASSESSMENT — ENCOUNTER SYMPTOMS
SORE THROAT: 0
CONSTIPATION: 0
DIZZINESS: 0
NAUSEA: 0
PARESTHESIAS: 0
DIARRHEA: 0
CHILLS: 0
DYSURIA: 0
WEAKNESS: 0
HEARTBURN: 0
FREQUENCY: 0
JOINT SWELLING: 0
PALPITATIONS: 0
MYALGIAS: 0
FEVER: 0
SHORTNESS OF BREATH: 0
ARTHRALGIAS: 0
HEADACHES: 0
HEMATOCHEZIA: 0
NERVOUS/ANXIOUS: 0
HEMATURIA: 0
ABDOMINAL PAIN: 0
EYE PAIN: 0
COUGH: 0

## 2022-11-30 ASSESSMENT — ACTIVITIES OF DAILY LIVING (ADL): CURRENT_FUNCTION: NO ASSISTANCE NEEDED

## 2022-12-07 ENCOUNTER — OFFICE VISIT (OUTPATIENT)
Dept: FAMILY MEDICINE | Facility: CLINIC | Age: 67
End: 2022-12-07
Payer: COMMERCIAL

## 2022-12-07 VITALS
HEIGHT: 73 IN | BODY MASS INDEX: 35.45 KG/M2 | HEART RATE: 65 BPM | RESPIRATION RATE: 16 BRPM | SYSTOLIC BLOOD PRESSURE: 136 MMHG | WEIGHT: 267.5 LBS | DIASTOLIC BLOOD PRESSURE: 78 MMHG | OXYGEN SATURATION: 96 %

## 2022-12-07 DIAGNOSIS — E78.5 HYPERLIPIDEMIA WITH TARGET LDL LESS THAN 100: ICD-10-CM

## 2022-12-07 DIAGNOSIS — Z79.4 TYPE 2 DIABETES MELLITUS WITH COMPLICATION, WITH LONG-TERM CURRENT USE OF INSULIN (H): Primary | ICD-10-CM

## 2022-12-07 DIAGNOSIS — E11.8 TYPE 2 DIABETES MELLITUS WITH COMPLICATION, WITH LONG-TERM CURRENT USE OF INSULIN (H): Primary | ICD-10-CM

## 2022-12-07 DIAGNOSIS — N43.2 OTHER HYDROCELE: ICD-10-CM

## 2022-12-07 DIAGNOSIS — D63.8 ANEMIA OF CHRONIC DISEASE: ICD-10-CM

## 2022-12-07 DIAGNOSIS — E55.9 VITAMIN D DEFICIENCY: ICD-10-CM

## 2022-12-07 DIAGNOSIS — E66.01 CLASS 2 SEVERE OBESITY WITH SERIOUS COMORBIDITY AND BODY MASS INDEX (BMI) OF 35.0 TO 35.9 IN ADULT, UNSPECIFIED OBESITY TYPE (H): ICD-10-CM

## 2022-12-07 DIAGNOSIS — I10 ESSENTIAL HYPERTENSION: ICD-10-CM

## 2022-12-07 DIAGNOSIS — Z13.0 SCREENING, ANEMIA, DEFICIENCY, IRON: ICD-10-CM

## 2022-12-07 DIAGNOSIS — E11.42 DIABETIC POLYNEUROPATHY ASSOCIATED WITH TYPE 2 DIABETES MELLITUS (H): ICD-10-CM

## 2022-12-07 DIAGNOSIS — Z00.00 ENCOUNTER FOR PREVENTIVE CARE: ICD-10-CM

## 2022-12-07 DIAGNOSIS — R80.9 MICROALBUMINURIA: ICD-10-CM

## 2022-12-07 DIAGNOSIS — E66.812 CLASS 2 SEVERE OBESITY WITH SERIOUS COMORBIDITY AND BODY MASS INDEX (BMI) OF 35.0 TO 35.9 IN ADULT, UNSPECIFIED OBESITY TYPE (H): ICD-10-CM

## 2022-12-07 DIAGNOSIS — Z00.00 ENCOUNTER FOR MEDICARE ANNUAL WELLNESS EXAM: ICD-10-CM

## 2022-12-07 DIAGNOSIS — E11.3299 MILD NONPROLIFERATIVE DIABETIC RETINOPATHY ASSOCIATED WITH TYPE 2 DIABETES MELLITUS, MACULAR EDEMA PRESENCE UNSPECIFIED, UNSPECIFIED LATERALITY (H): ICD-10-CM

## 2022-12-07 DIAGNOSIS — D72.821 MONOCYTOSIS: ICD-10-CM

## 2022-12-07 DIAGNOSIS — N52.9 IMPOTENCE: ICD-10-CM

## 2022-12-07 LAB
BASOPHILS # BLD MANUAL: 0 10E3/UL (ref 0–0.2)
BASOPHILS NFR BLD MANUAL: 0 %
EOSINOPHIL # BLD MANUAL: 0.2 10E3/UL (ref 0–0.7)
EOSINOPHIL NFR BLD MANUAL: 2 %
ERYTHROCYTE [DISTWIDTH] IN BLOOD BY AUTOMATED COUNT: 14.2 % (ref 10–15)
HBA1C MFR BLD: 6.8 % (ref 0–5.6)
HCT VFR BLD AUTO: 45.9 % (ref 40–53)
HGB BLD-MCNC: 14.4 G/DL (ref 13.3–17.7)
HOLD SPECIMEN: NORMAL
HOLD SPECIMEN: NORMAL
LYMPHOCYTES # BLD MANUAL: 2.4 10E3/UL (ref 0.8–5.3)
LYMPHOCYTES NFR BLD MANUAL: 30 %
MCH RBC QN AUTO: 25.6 PG (ref 26.5–33)
MCHC RBC AUTO-ENTMCNC: 31.4 G/DL (ref 31.5–36.5)
MCV RBC AUTO: 82 FL (ref 78–100)
MONOCYTES # BLD MANUAL: 1.5 10E3/UL (ref 0–1.3)
MONOCYTES NFR BLD MANUAL: 19 %
NEUTROPHILS # BLD MANUAL: 3.9 10E3/UL (ref 1.6–8.3)
NEUTROPHILS NFR BLD MANUAL: 49 %
PLAT MORPH BLD: ABNORMAL
PLATELET # BLD AUTO: 291 10E3/UL (ref 150–450)
RBC # BLD AUTO: 5.63 10E6/UL (ref 4.4–5.9)
RBC MORPH BLD: ABNORMAL
WBC # BLD AUTO: 8 10E3/UL (ref 4–11)

## 2022-12-07 PROCEDURE — 99213 OFFICE O/P EST LOW 20 MIN: CPT | Mod: 25 | Performed by: FAMILY MEDICINE

## 2022-12-07 PROCEDURE — 85027 COMPLETE CBC AUTOMATED: CPT | Performed by: FAMILY MEDICINE

## 2022-12-07 PROCEDURE — G0009 ADMIN PNEUMOCOCCAL VACCINE: HCPCS | Performed by: FAMILY MEDICINE

## 2022-12-07 PROCEDURE — 85007 BL SMEAR W/DIFF WBC COUNT: CPT | Performed by: FAMILY MEDICINE

## 2022-12-07 PROCEDURE — 83036 HEMOGLOBIN GLYCOSYLATED A1C: CPT | Performed by: FAMILY MEDICINE

## 2022-12-07 PROCEDURE — 99397 PER PM REEVAL EST PAT 65+ YR: CPT | Performed by: FAMILY MEDICINE

## 2022-12-07 PROCEDURE — 90670 PCV13 VACCINE IM: CPT | Performed by: FAMILY MEDICINE

## 2022-12-07 PROCEDURE — 36415 COLL VENOUS BLD VENIPUNCTURE: CPT | Performed by: FAMILY MEDICINE

## 2022-12-07 ASSESSMENT — ENCOUNTER SYMPTOMS
FREQUENCY: 0
CONSTIPATION: 0
SORE THROAT: 0
NERVOUS/ANXIOUS: 0
HEARTBURN: 0
DIZZINESS: 0
DYSURIA: 0
PALPITATIONS: 0
NAUSEA: 0
MYALGIAS: 0
HEMATOCHEZIA: 0
JOINT SWELLING: 0
EYE PAIN: 0
COUGH: 0
SHORTNESS OF BREATH: 0
PARESTHESIAS: 0
HEADACHES: 0
WEAKNESS: 0
FEVER: 0
ABDOMINAL PAIN: 0
CHILLS: 0
ARTHRALGIAS: 0
DIARRHEA: 0
HEMATURIA: 0

## 2022-12-07 ASSESSMENT — ACTIVITIES OF DAILY LIVING (ADL): CURRENT_FUNCTION: NO ASSISTANCE NEEDED

## 2022-12-07 NOTE — ASSESSMENT & PLAN NOTE
He also has a hydrocele so urology consult has already been placed and he is reminded to schedule.

## 2022-12-07 NOTE — ASSESSMENT & PLAN NOTE
Normal cbc recently. Diff not done, can repeat with diff.     Addendum: Ongoing monocytosis. Unclear reason, will consult hematology again, in past they thought it was reactive, but it is persistent so will see what they think now. - 12/13/2022

## 2022-12-07 NOTE — PROGRESS NOTES
"Diabetes, abnormal lipids, blood pressure, hydrocele addressed above and beyond usual scope of annual exam.     SUBJECTIVE:   Artem is a 67 year old who presents for Preventive Visit.  Patient has been advised of split billing requirements and indicates understanding: Yes  Are you in the first 12 months of your Medicare coverage?  Saw eye doctor in June of 2022 with [facility] [place], signed KIRSTIN, faxed to [facility] to obtain record    Healthy Habits:     In general, how would you rate your overall health?  Good    Frequency of exercise:  2-3 days/week    Duration of exercise:  15-30 minutes    Do you usually eat at least 4 servings of fruit and vegetables a day, include whole grains    & fiber and avoid regularly eating high fat or \"junk\" foods?  Yes    Taking medications regularly:  Yes    Medication side effects:  None    Ability to successfully perform activities of daily living:  No assistance needed    Home Safety:  No safety concerns identified    Hearing Impairment:  No hearing concerns    In the past 6 months, have you been bothered by leaking of urine?  No    In general, how would you rate your overall mental or emotional health?  Good      PHQ-2 Total Score: 0    Additional concerns today:  No  Imm/Inj  Pertinent negatives include no abdominal pain, arthralgias, chest pain, chills, congestion, coughing, fever, headaches, joint swelling, myalgias, nausea, rash, sore throat or weakness.     Have you ever done Advance Care Planning? (For example, a Health Directive, POLST, or a discussion with a medical provider or your loved ones about your wishes): Yes, patient states has an Advance Care Planning document and will bring a copy to the clinic.    Fall risk  Fallen 2 or more times in the past year?: No  Any fall with injury in the past year?: No    Cognitive Screening   1) Repeat 3 items (Leader, Season, Table)    2) Clock draw: NORMAL  3) 3 item recall: Recalls 3 objects  Results: 3 items recalled: " COGNITIVE IMPAIRMENT LESS LIKELY    Mini-CogTM Copyright DEVONTE Cruz. Licensed by the author for use in [facility]; reprinted with permission (dayna@.[facility]). All rights reserved.      Do you have sleep apnea, excessive snoring or daytime drowsiness?: no    Reviewed and updated as needed this visit by clinical staff   Tobacco  Allergies  Meds  Problems  Med Hx  Surg Hx  Fam Hx  Soc   Hx        Reviewed and updated as needed this visit by Provider   Tobacco  Allergies  Meds  Problems  Med Hx  Surg Hx  Fam Hx  Soc   Hx       Social History     Tobacco Use     Smoking status: Former     Types: Cigarettes     Quit date: 1978     Years since quittin.9     Smokeless tobacco: Never   Substance Use Topics     Alcohol use: Yes     Comment: Very Rarely.  3 or 4 cans of beer a year       Alcohol Use 2022   Prescreen: >3 drinks/day or >7 drinks/week? No     Current providers sharing in care for this patient include:   Patient Care Team:  Regi Marroquin MD as PCP - General (Family Medicine)  Marga Smart MD as MD (Hematology & Oncology)  Padilla Pan MD as MD (Urology)  Regi Marroquin MD as Assigned PCP  Padilla Pan MD as Assigned Surgical Provider    The following health maintenance items are reviewed in Epic and correct as of today:  Health Maintenance   Topic Date Due     COVID-19 Vaccine (3 - Booster for Moderna series) 2021     AORTIC ANEURYSM SCREENING (SYSTEM ASSIGNED)  Never done     EYE EXAM  10/01/2022     A1C  2023     BMP  2023     LIPID  2023     MICROALBUMIN  2023     ANNUAL REVIEW OF HM ORDERS  2023     MEDICARE ANNUAL WELLNESS VISIT  2023     DIABETIC FOOT EXAM  2023     FALL RISK ASSESSMENT  2023     Pneumococcal Vaccine: 65+ Years (3 - PPSV23 if available, else PCV20) 2023     COLORECTAL CANCER SCREENING  2027     ADVANCE CARE PLANNING  2027     DTAP/TDAP/TD  "IMMUNIZATION (3 - Td or Tdap) 12/27/2029     HEPATITIS C SCREENING  Completed     HIV SCREENING  Completed     PHQ-2 (once per calendar year)  Completed     INFLUENZA VACCINE  Completed     ZOSTER IMMUNIZATION  Completed     IPV IMMUNIZATION  Aged Out     MENINGITIS IMMUNIZATION  Aged Out       Review of Systems   Constitutional: Negative for chills and fever.   HENT: Negative for congestion, ear pain, hearing loss and sore throat.    Eyes: Negative for pain and visual disturbance.   Respiratory: Negative for cough and shortness of breath.    Cardiovascular: Negative for chest pain, palpitations and peripheral edema.   Gastrointestinal: Negative for abdominal pain, constipation, diarrhea, heartburn, hematochezia and nausea.   Genitourinary: Positive for impotence. Negative for dysuria, frequency, genital sores, hematuria, penile discharge and urgency.   Musculoskeletal: Negative for arthralgias, joint swelling and myalgias.   Skin: Negative for rash.   Neurological: Negative for dizziness, weakness, headaches and paresthesias.   Psychiatric/Behavioral: Negative for mood changes. The patient is not nervous/anxious.        OBJECTIVE:   /78 (BP Location: Left arm, Patient Position: Sitting, Cuff Size: Adult Large)   Pulse 65   Resp 16   Ht 1.854 m (6' 1\")   Wt 121.3 kg (267 lb 8 oz)   SpO2 96%   BMI 35.29 kg/m   Estimated body mass index is 35.29 kg/m  as calculated from the following:    Height as of this encounter: 1.854 m (6' 1\").    Weight as of this encounter: 121.3 kg (267 lb 8 oz).  Physical Exam  Constitutional:       Appearance: Normal appearance.   HENT:      Head: Normocephalic and atraumatic.   Cardiovascular:      Rate and Rhythm: Normal rate and regular rhythm.      Heart sounds: Normal heart sounds.   Pulmonary:      Effort: Pulmonary effort is normal.      Breath sounds: Normal breath sounds.   Abdominal:      General: Bowel sounds are normal.      Palpations: Abdomen is soft. "   Musculoskeletal:         General: Normal range of motion.      Cervical back: Normal range of motion and neck supple.   Neurological:      General: No focal deficit present.      Mental Status: He is alert.       Diabetic foot exam: normal DP and PT pulses, no trophic changes or ulcerative lesions and normal sensory exam      ASSESSMENT / PLAN:     Problem List Items Addressed This Visit        Nervous and Auditory    Diabetic polyneuropathy associated with type 2 diabetes mellitus (H)     Controlled with A1c 6.8 today.  Continue Jardiance 10 mg orally per day  Continue Lantus 30 units every morning/60 units nightly  Continue lispro sliding scale insulin  Continue metformin 1000 mg p.o. twice daily  Diabetic foot exam done 12/7/2022  Diabetic eye exam done 11/2022, notes are being obtained  He is on an ACE inhibitor, valsartan, aspirin, and Lipitor   follow-up in 3 months            Digestive    Vitamin D deficiency     Normal when checked recently.         Class 2 severe obesity with serious comorbidity and body mass index (BMI) of 35.0 to 35.9 in adult, unspecified obesity type (H)     Stable healthy lifestyle discussed, focused on exercise today            Endocrine    Type 2 diabetes mellitus with complication, with long-term current use of insulin (H) - Primary     Controlled with A1c 6.8 today.  Continue Jardiance 10 mg orally per day  Continue Lantus 30 units every morning/60 units nightly  Continue lispro sliding scale insulin  Continue metformin 1000 mg p.o. twice daily  Diabetic foot exam done 12/7/2022  Diabetic eye exam done 11/2022, notes are being obtained  He is on an ACE inhibitor, valsartan, aspirin, and Lipitor   follow-up in 3 months         Relevant Orders    Hemoglobin A1c (Completed)    Hyperlipidemia with target LDL less than 100     Lipids were reviewed.  He has low HDL.  The importance of exercise was reviewed today.         Mild nonproliferative diabetic retinopathy (H)     He states he  did have an eye exam done 11/2022.  Will defer to ophthalmologist.            Circulatory    Essential hypertension     Blood pressure is 136/78 today.  Continue current medications which include Diovan 320/25/day, Toprol-XL 50 mg per orally per day, and Norvasc 10 mg orally per day.            Immune    Monocytosis     Normal cbc recently. Diff not done, can repeat with diff.     Addendum: Ongoing monocytosis. Unclear reason, will consult hematology again, in past they thought it was reactive, but it is persistent so will see what they think now. - 12/13/2022             Urinary    Other hydrocele     Urology consult has been ordered.  Ultrasound showed hydrocele.  He also has problems with impotence and so urology should be able to review his options.             Hematologic    RESOLVED: Anemia of chronic disease     Normal CBC noted recently.  Will resolve.            Other    Microalbuminuria     Continue to work on good control of diabetes.         Encounter for preventive care     Recommended vaccines: COVID #3 and pneumo 13.   psa done this year and normal.   Colonoscopy: plan for 2027 screening  Std testing desired:  offered  Osteoporosis prevention discussed.  vitamin d levels ordered. Recommend daily calcium and vitamin d intake to keep good bone health. Recommend weight bearing exercise, no tobacco, and limit alcohol  dexa -  There is no family or personal history, not indicated    Recommend sunscreen, exercise, & healthy diet.  Offered cbc, cmp, lipids and asked what other testing he  desires today  I have had an Advance Directives discussion with the patient.   Body mass index is 35.29 kg/m .   mychart active.          Impotence     He also has a hydrocele so urology consult has already been placed and he is reminded to schedule.        Other Visit Diagnoses     Encounter for Medicare annual wellness exam        Screening, anemia, deficiency, iron        Relevant Orders    CBC with platelets and  "differential (Completed)          Patient has been advised of split billing requirements and indicates understanding: Yes      COUNSELING:  Reviewed preventive health counseling, as reflected in patient instructions       Regular exercise       Healthy diet/nutrition       Aspirin prophylaxis        Safe sex practices/STD prevention      BMI:   Estimated body mass index is 35.29 kg/m  as calculated from the following:    Height as of this encounter: 1.854 m (6' 1\").    Weight as of this encounter: 121.3 kg (267 lb 8 oz).   Weight management plan: Discussed healthy diet and exercise guidelines      He reports that he quit smoking about 44 years ago. His smoking use included cigarettes. He has never used smokeless tobacco.      Appropriate preventive services were discussed with this patient, including applicable screening as appropriate for cardiovascular disease, diabetes, osteopenia/osteoporosis, and glaucoma.  As appropriate for age/gender, discussed screening for colorectal cancer, prostate cancer, breast cancer, and cervical cancer. Checklist reviewing preventive services available has been given to the patient.    Reviewed patients plan of care and provided an AVS. The Basic Care Plan (routine screening as documented in Health Maintenance) for Artem meets the Care Plan requirement. This Care Plan has been established and reviewed with the Patient.      Regi Marroquin MD  [facility]    Identified Health Risks:  "

## 2022-12-07 NOTE — PATIENT INSTRUCTIONS
Patient Education   Personalized Prevention Plan  You are due for the preventive services outlined below.  Your care team is available to assist you in scheduling these services.  If you have already completed any of these items, please share that information with your care team to update in your medical record.  Health Maintenance Due   Topic Date Due     COVID-19 Vaccine (3 - Booster for Moderna series) 05/28/2021     Diabetic Foot Exam  05/20/2022     Annual Wellness Visit  07/28/2022     AORTIC ANEURYSM SCREENING (SYSTEM ASSIGNED)  Never done     Eye Exam  10/01/2022     Pneumococcal Vaccine (3 - PPSV23 if available, else PCV20) 12/21/2022

## 2022-12-07 NOTE — ASSESSMENT & PLAN NOTE
Blood pressure is 136/78 today.  Continue current medications which include Diovan 320/25/day, Toprol-XL 50 mg per orally per day, and Norvasc 10 mg orally per day.

## 2022-12-07 NOTE — ASSESSMENT & PLAN NOTE
Recommended vaccines: COVID #3 and pneumo 13.   psa done this year and normal.   Colonoscopy: plan for 2027 screening  Std testing desired:  offered  Osteoporosis prevention discussed.  vitamin d levels ordered. Recommend daily calcium and vitamin d intake to keep good bone health. Recommend weight bearing exercise, no tobacco, and limit alcohol  dexa -  There is no family or personal history, not indicated    Recommend sunscreen, exercise, & healthy diet.  Offered cbc, cmp, lipids and asked what other testing he  desires today  I have had an Advance Directives discussion with the patient.   Body mass index is 35.29 kg/m .   mychart active.

## 2022-12-07 NOTE — ASSESSMENT & PLAN NOTE
Urology consult has been ordered.  Ultrasound showed hydrocele.  He also has problems with impotence and so urology should be able to review his options.

## 2022-12-07 NOTE — ASSESSMENT & PLAN NOTE
Controlled with A1c 6.8 today.  Continue Jardiance 10 mg orally per day  Continue Lantus 30 units every morning/60 units nightly  Continue lispro sliding scale insulin  Continue metformin 1000 mg p.o. twice daily  Diabetic foot exam done 12/7/2022  Diabetic eye exam done 11/2022, notes are being obtained  He is on an ACE inhibitor, valsartan, aspirin, and Lipitor   follow-up in 3 months

## 2022-12-07 NOTE — ASSESSMENT & PLAN NOTE
>>ASSESSMENT AND PLAN FOR DIABETIC POLYNEUROPATHY ASSOCIATED WITH TYPE 2 DIABETES MELLITUS (H) WRITTEN ON 12/7/2022  9:42 AM BY KANG BOBO MD    Controlled with A1c 6.8 today.  Continue Jardiance 10 mg orally per day  Continue Lantus 30 units every morning/60 units nightly  Continue lispro sliding scale insulin  Continue metformin 1000 mg p.o. twice daily  Diabetic foot exam done 12/7/2022  Diabetic eye exam done 11/2022, notes are being obtained  He is on an ACE inhibitor, valsartan, aspirin, and Lipitor   follow-up in 3 months    >>ASSESSMENT AND PLAN FOR TYPE 2 DIABETES MELLITUS WITH COMPLICATION, WITH LONG-TERM CURRENT USE OF INSULIN (H) WRITTEN ON 12/7/2022  9:45 AM BY KANG BOBO MD    Controlled with A1c 6.8 today.  Continue Jardiance 10 mg orally per day  Continue Lantus 30 units every morning/60 units nightly  Continue lispro sliding scale insulin  Continue metformin 1000 mg p.o. twice daily  Diabetic foot exam done 12/7/2022  Diabetic eye exam done 11/2022, notes are being obtained  He is on an ACE inhibitor, valsartan, aspirin, and Lipitor   follow-up in 3 months

## 2023-01-02 ENCOUNTER — MYC MEDICAL ADVICE (OUTPATIENT)
Dept: FAMILY MEDICINE | Facility: CLINIC | Age: 68
End: 2023-01-02

## 2023-01-02 DIAGNOSIS — E11.8 TYPE 2 DIABETES MELLITUS WITH COMPLICATION, WITH LONG-TERM CURRENT USE OF INSULIN (H): ICD-10-CM

## 2023-01-02 DIAGNOSIS — Z79.4 TYPE 2 DIABETES MELLITUS WITH COMPLICATION, WITH LONG-TERM CURRENT USE OF INSULIN (H): ICD-10-CM

## 2023-01-03 RX ORDER — INSULIN LISPRO 100 [IU]/ML
INJECTION, SOLUTION INTRAVENOUS; SUBCUTANEOUS
Qty: 15 ML | Refills: 3 | Status: SHIPPED | OUTPATIENT
Start: 2023-01-03 | End: 2023-03-15

## 2023-03-07 ENCOUNTER — TRANSFERRED RECORDS (OUTPATIENT)
Dept: MULTI SPECIALTY CLINIC | Facility: CLINIC | Age: 68
End: 2023-03-07
Payer: COMMERCIAL

## 2023-03-07 LAB — RETINOPATHY: NORMAL

## 2023-03-15 ENCOUNTER — OFFICE VISIT (OUTPATIENT)
Dept: FAMILY MEDICINE | Facility: CLINIC | Age: 68
End: 2023-03-15
Payer: COMMERCIAL

## 2023-03-15 VITALS
RESPIRATION RATE: 16 BRPM | OXYGEN SATURATION: 97 % | WEIGHT: 279.4 LBS | HEIGHT: 73 IN | HEART RATE: 57 BPM | SYSTOLIC BLOOD PRESSURE: 130 MMHG | BODY MASS INDEX: 37.03 KG/M2 | DIASTOLIC BLOOD PRESSURE: 74 MMHG

## 2023-03-15 DIAGNOSIS — E11.8 TYPE 2 DIABETES MELLITUS WITH COMPLICATION, WITH LONG-TERM CURRENT USE OF INSULIN (H): Primary | ICD-10-CM

## 2023-03-15 DIAGNOSIS — I10 ESSENTIAL HYPERTENSION: ICD-10-CM

## 2023-03-15 DIAGNOSIS — E66.812 CLASS 2 SEVERE OBESITY WITH SERIOUS COMORBIDITY AND BODY MASS INDEX (BMI) OF 36.0 TO 36.9 IN ADULT, UNSPECIFIED OBESITY TYPE (H): ICD-10-CM

## 2023-03-15 DIAGNOSIS — E11.3299 MILD NONPROLIFERATIVE DIABETIC RETINOPATHY ASSOCIATED WITH TYPE 2 DIABETES MELLITUS, MACULAR EDEMA PRESENCE UNSPECIFIED, UNSPECIFIED LATERALITY (H): ICD-10-CM

## 2023-03-15 DIAGNOSIS — E78.5 HYPERLIPIDEMIA WITH TARGET LDL LESS THAN 100: ICD-10-CM

## 2023-03-15 DIAGNOSIS — E66.01 CLASS 2 SEVERE OBESITY WITH SERIOUS COMORBIDITY AND BODY MASS INDEX (BMI) OF 36.0 TO 36.9 IN ADULT, UNSPECIFIED OBESITY TYPE (H): ICD-10-CM

## 2023-03-15 DIAGNOSIS — Z79.4 TYPE 2 DIABETES MELLITUS WITH COMPLICATION, WITH LONG-TERM CURRENT USE OF INSULIN (H): Primary | ICD-10-CM

## 2023-03-15 DIAGNOSIS — N52.9 IMPOTENCE: ICD-10-CM

## 2023-03-15 LAB
HBA1C MFR BLD: 8 % (ref 0–5.6)
HOLD SPECIMEN: NORMAL

## 2023-03-15 PROCEDURE — 99214 OFFICE O/P EST MOD 30 MIN: CPT | Performed by: FAMILY MEDICINE

## 2023-03-15 PROCEDURE — 36415 COLL VENOUS BLD VENIPUNCTURE: CPT | Performed by: FAMILY MEDICINE

## 2023-03-15 PROCEDURE — 83036 HEMOGLOBIN GLYCOSYLATED A1C: CPT | Performed by: FAMILY MEDICINE

## 2023-03-15 RX ORDER — INSULIN LISPRO 100 [IU]/ML
INJECTION, SOLUTION INTRAVENOUS; SUBCUTANEOUS
Qty: 15 ML | Refills: 3 | Status: SHIPPED | OUTPATIENT
Start: 2023-03-15 | End: 2023-11-05

## 2023-03-15 NOTE — ASSESSMENT & PLAN NOTE
Severe obesity as evidenced by BMI 36.86 today and chronic comorbid weight related conditions including diabetes with retinopathy and neuropathy as well as hypertension and impotence.  Weight reduction is strongly recommended.

## 2023-03-15 NOTE — ASSESSMENT & PLAN NOTE
>>ASSESSMENT AND PLAN FOR TYPE 2 DIABETES MELLITUS WITH COMPLICATION, WITH LONG-TERM CURRENT USE OF INSULIN (H) WRITTEN ON 3/15/2023  9:13 AM BY KANG BOBO MD    Worsening type 2 diabetes-A1c is up from 6.8 in December to 8.0 today.  Continue Jardiance 10 mg orally per day  Continue Lantus 30 units every morning/60 units nightly  Continue lispro sliding scale insulin  Continue metformin 1000 mg p.o. twice daily  Diabetic foot exam done 12/7/2022  Diabetic eye exam done 11/2022, notes are being obtained  He is on an ACE inhibitor, valsartan, aspirin, and Lipitor   Recommend endocrine consult  follow-up in 3 months with me or endocrine. Also diabetic education recommended and ordered.

## 2023-03-15 NOTE — PROGRESS NOTES
Problem List Items Addressed This Visit        Digestive    Class 2 severe obesity with serious comorbidity and body mass index (BMI) of 36.0 to 36.9 in adult, unspecified obesity type (H)     Severe obesity as evidenced by BMI 36.86 today and chronic comorbid weight related conditions including diabetes with retinopathy and neuropathy as well as hypertension and impotence.  Weight reduction is strongly recommended.         Relevant Medications    insulin lispro (HUMALOG KWIKPEN) 100 UNIT/ML (1 unit dial) KWIKPEN       Endocrine    Type 2 diabetes mellitus with complication, with long-term current use of insulin (H) - Primary     Worsening type 2 diabetes-A1c is up from 6.8 in December to 8.0 today.  Continue Jardiance 10 mg orally per day  Continue Lantus 30 units every morning/60 units nightly  Continue lispro sliding scale insulin  Continue metformin 1000 mg p.o. twice daily  Diabetic foot exam done 12/7/2022  Diabetic eye exam done 11/2022, notes are being obtained  He is on an ACE inhibitor, valsartan, aspirin, and Lipitor   Recommend endocrine consult  follow-up in 3 months with me or endocrine. Also diabetic education recommended and ordered.          Relevant Medications    insulin lispro (HUMALOG KWIKPEN) 100 UNIT/ML (1 unit dial) KWIKPEN    Other Relevant Orders    Hemoglobin A1c (Completed)    Adult Endocrinology  Referral    AMB Adult Diabetes Educator Referral    Hyperlipidemia with target LDL less than 100     Hyperlipidemia-weight reduction is strongly recommended.         Mild nonproliferative diabetic retinopathy (H)     Recent eye exam was obtained and is being scanned to the chart today.         Relevant Medications    insulin lispro (HUMALOG KWIKPEN) 100 UNIT/ML (1 unit dial) KWIKPEN       Circulatory    Essential hypertension     Hypertension-controlled blood pressure 130/74 today.  Continue current medications which include Diovan 320/25/day, Toprol-XL 50 mg per orally per day,  "and Norvasc 10 mg orally per day.            Other    Impotence     Weight reduction strongly recommended.           Follow-up Visit   Expected date:  Ladarius 15, 2023 (Approximate)      Follow Up Appointment Details:     Follow-up with whom?: PCP    Follow-Up for what?: Chronic Disease f/u    Chronic Disease f/u: Diabetes    How?: In Person or Virtual    Is this an as-needed follow-up?: No                     Subjective   Artem is a 67 year old who presents for the following health issues  Chief Complaint   Patient presents with     Follow Up     Diabetes and A1C, had eye exam on 3/7/22 at [facility]  Need 90 days supplies of Humalog not 30 days      History of Present Illness       Diabetes:   He presents for follow up of diabetes.  He is checking home blood glucose three times daily. He checks blood glucose before and after meals and at bedtime.  Blood glucose is sometimes over 200 and never under 70. He is aware of hypoglycemia symptoms including shakiness. He has no concerns regarding his diabetes at this time.  He is having numbness in feet and weight gain. The patient has had a diabetic eye exam in the last 12 months. Eye exam performed on 3/7/2023. Location of last eye exam [facility].        He eats 2-3 servings of fruits and vegetables daily.He consumes 0 sweetened beverage(s) daily.He exercises with enough effort to increase his heart rate 10 to 19 minutes per day.  He exercises with enough effort to increase his heart rate 4 days per week.   He is taking medications regularly.           Objective    /74 (BP Location: Left arm, Patient Position: Sitting, Cuff Size: Adult Large)   Pulse 57   Resp 16   Ht 1.854 m (6' 1\")   Wt 126.7 kg (279 lb 6.4 oz)   SpO2 97%   BMI 36.86 kg/m    Body mass index is 36.86 kg/m .  Physical Exam  Constitutional:       Appearance: Normal appearance.   HENT:      Head: Normocephalic and atraumatic.   Cardiovascular:      Rate and Rhythm: Normal rate and " regular rhythm.   Pulmonary:      Effort: Pulmonary effort is normal.   Musculoskeletal:         General: Normal range of motion.      Cervical back: Normal range of motion and neck supple.   Neurological:      General: No focal deficit present.      Mental Status: He is alert.            This note has been dictated using voice recognition software. Any grammatical or context distortions are unintentional and inherent to the software

## 2023-03-15 NOTE — ASSESSMENT & PLAN NOTE
Hypertension-controlled blood pressure 130/74 today.  Continue current medications which include Diovan 320/25/day, Toprol-XL 50 mg per orally per day, and Norvasc 10 mg orally per day.

## 2023-03-15 NOTE — ASSESSMENT & PLAN NOTE
Worsening type 2 diabetes-A1c is up from 6.8 in December to 8.0 today.  Continue Jardiance 10 mg orally per day  Continue Lantus 30 units every morning/60 units nightly  Continue lispro sliding scale insulin  Continue metformin 1000 mg p.o. twice daily  Diabetic foot exam done 12/7/2022  Diabetic eye exam done 11/2022, notes are being obtained  He is on an ACE inhibitor, valsartan, aspirin, and Lipitor   Recommend endocrine consult  follow-up in 3 months with me or endocrine. Also diabetic education recommended and ordered.

## 2023-06-29 ENCOUNTER — TELEPHONE (OUTPATIENT)
Dept: FAMILY MEDICINE | Facility: CLINIC | Age: 68
End: 2023-06-29
Payer: COMMERCIAL

## 2023-06-29 NOTE — TELEPHONE ENCOUNTER
Patient Quality Outreach    Patient is due for the following:   Diabetes -  Diabetic Follow-Up Visit    Next Steps:   pt is driving at the time of call, will call back to schedule a visit when have calendar    Type of outreach:    Phone, spoke to patient/parent. Patient    Next Steps:  Reach out within 90 days via Phone.    Max number of attempts reached: No. Will try again in 90 days if patient still on fail list.    Questions for provider review:    None           Yaya Browning MA  Chart routed to self.

## 2023-07-19 ASSESSMENT — ENCOUNTER SYMPTOMS
HEMATURIA: 0
COUGH: 1
WHEEZING: 0
DYSPNEA ON EXERTION: 0
HEMOPTYSIS: 0
JAUNDICE: 0
EYE REDNESS: 0
HYPOTENSION: 0
EYE IRRITATION: 0
PALPITATIONS: 0
ORTHOPNEA: 0
DOUBLE VISION: 0
CONSTIPATION: 0
BLOATING: 0
FLANK PAIN: 0
EYE PAIN: 0
SNORES LOUDLY: 1
DIFFICULTY URINATING: 0
NAUSEA: 0
SPUTUM PRODUCTION: 1
SYNCOPE: 0
HYPERTENSION: 1
POSTURAL DYSPNEA: 0
EYE WATERING: 0
SHORTNESS OF BREATH: 0
DYSURIA: 0
ABDOMINAL PAIN: 0
RECTAL PAIN: 0
BOWEL INCONTINENCE: 0
EXERCISE INTOLERANCE: 0
BLOOD IN STOOL: 0
VOMITING: 0
HEARTBURN: 1
LEG PAIN: 0
LIGHT-HEADEDNESS: 0
COUGH DISTURBING SLEEP: 0
SLEEP DISTURBANCES DUE TO BREATHING: 0
DIARRHEA: 1

## 2023-07-25 ENCOUNTER — OFFICE VISIT (OUTPATIENT)
Dept: ENDOCRINOLOGY | Facility: CLINIC | Age: 68
End: 2023-07-25
Attending: FAMILY MEDICINE
Payer: COMMERCIAL

## 2023-07-25 VITALS
HEART RATE: 60 BPM | SYSTOLIC BLOOD PRESSURE: 148 MMHG | DIASTOLIC BLOOD PRESSURE: 68 MMHG | BODY MASS INDEX: 36.28 KG/M2 | OXYGEN SATURATION: 94 % | WEIGHT: 275 LBS

## 2023-07-25 DIAGNOSIS — Z79.4 TYPE 2 DIABETES MELLITUS WITH COMPLICATION, WITH LONG-TERM CURRENT USE OF INSULIN (H): ICD-10-CM

## 2023-07-25 DIAGNOSIS — E11.8 TYPE 2 DIABETES MELLITUS WITH COMPLICATION, WITH LONG-TERM CURRENT USE OF INSULIN (H): ICD-10-CM

## 2023-07-25 PROCEDURE — 99214 OFFICE O/P EST MOD 30 MIN: CPT | Performed by: NURSE PRACTITIONER

## 2023-07-25 NOTE — PROGRESS NOTES
"[facility] ENDOCRINOLOGY    Diabetes Note 7/25/2023    Artem Pryor, 1955, [id number]          Reason for visit      1. Type 2 diabetes mellitus with complication, with long-term current use of insulin (H)        HPI     Artem Pryor is a very pleasant 67 year old old male who presents for follow up.  SUMMARY:    Artem is here today in referral for DM 2. He was dx in 2003. Extensive fm hx of DM, including Type 2 in Grandmother, Father, and a brother, who is in Skilled Care. His Youngest Brother had Type 1, and has passed because of complications.     His most recent A1c was 8.0, and unfortunately was done in the middle of March. This was up quite a bit from his previous of 6.8.    He is reporting that the period of time prior to the 6.8 was full of a lot of physical work. He has just laid his Mother to rest, and at the time of the low A1c, he was \"digging out her house\" (he reports that she was hoarding) as she went to a Facility.  He states that the whole year was tough on him as she was deteriorating. He was doing a lot of yard work as well at the time.     He has been taking Lantus, 25-30 units in the morning and 45-60 units in the evening. He has been experimenting with his evening dose in working on his FBS. He is aware of the body's propensity to increase BG in early morning. Sometimes he eats a high protein snack at HS and sometimes he does not. He uses Humalog with meals, with a 1:3 CHO ratio. He takes Jardiance, 10 mg daily, as well as Metformin, 1000 mg BID.     BP slightly elevated today. He is on a Calcium Channel blocker, as well as ARB with hydrochlorothiazide.  Most recent Renal Panel was done in August of 2022. Normal function at that time. Hepatic function too, was within normal range.     Pt is on a Statin. Lipid Profile showed just below normal HDL. Triglycerides were 79, LDL was 61.     He endorses Neuropathy in both feet in the toes. This not problematic for him at night.     He " "had his last eye exam done in March of this year. He notes that he has been told that he has \"a lot of floater\". He has had Laser treatments in his R eye \"a long time ago\".       Blood glucose data:      Past Medical History     Patient Active Problem List   Diagnosis    Type 2 diabetes mellitus with complication, with long-term current use of insulin (H)    Essential hypertension    Hyperlipidemia with target LDL less than 100    Microalbuminuria    Vitamin D deficiency    Diabetic polyneuropathy associated with type 2 diabetes mellitus (H)    Monocytosis    Mild nonproliferative diabetic retinopathy (H)    Class 2 severe obesity with serious comorbidity and body mass index (BMI) of 36.0 to 36.9 in adult, unspecified obesity type (H)    Other hydrocele    Encounter for preventive care    Impotence        Family History       family history includes Breast Cancer in his father and mother; Cancer in his brother; Cerebrovascular Disease in his mother and paternal grandfather; Colon Cancer in his maternal grandfather; Colon Cancer (age of onset: 85) in his maternal uncle; Diabetes in his brother and paternal grandmother; Heart Failure in his father; Hypertension in his father and mother; Melanoma (age of onset: 70) in his father; Osteoporosis in his mother; Other Cancer in his brother; Prostate Cancer in his father; Thyroid Cancer (age of onset: 25) in his maternal cousin.    Social History      reports that he quit smoking about 45 years ago. His smoking use included cigarettes. He has never used smokeless tobacco. He reports current alcohol use. He reports that he does not use drugs.      Review of Systems     Patient has no polyuria or polydipsia, no chest pain, dyspnea or TIA's, no numbness, tingling or pain in extremities  Remainder negative except as noted in HPI.    Answers for HPI/ROS submitted by the patient on 7/19/2023  General Symptoms: No  Skin Symptoms: No  HENT Symptoms: No  EYE SYMPTOMS: Yes  HEART " SYMPTOMS: Yes  LUNG SYMPTOMS: Yes  INTESTINAL SYMPTOMS: Yes  URINARY SYMPTOMS: Yes  REPRODUCTIVE SYMPTOMS: Yes  SKELETAL SYMPTOMS: No  BLOOD SYMPTOMS: No  NERVOUS SYSTEM SYMPTOMS: No  MENTAL HEALTH SYMPTOMS: No  Eye pain: No  Vision loss: No  Dry eyes: No  Watery eyes: No  Eye bulging: No  Double vision: No  Flashing of lights: No  Spots: No  Floaters: Yes  Redness: No  Crossed eyes: No  Tunnel Vision: No  Yellowing of eyes: No  Eye irritation: No  Chest pain or pressure: No  Fast or irregular heartbeat: No  Pain in legs with walking: No  Trouble breathing while lying down: No  Fingers or toes appear blue: No  High blood pressure: Yes  Low blood pressure: No  Fainting: No  Murmurs: No  Pacemaker: No  Varicose veins: No  Edema or swelling: No  Wake up at night with shortness of breath: No  Light-headedness: No  Exercise intolerance: No  Cough: Yes  Sputum or phlegm: Yes  Coughing up blood: No  Difficulty breating or shortness of breath: No  Snoring: Yes  Wheezing: No  Difficulty breathing on exertion: No  Nighttime Cough: No  Difficulty breathing when lying flat: No  Heart burn or indigestion: Yes  Nausea: No  Vomiting: No  Abdominal pain: No  Bloating: No  Constipation: No  Diarrhea: Yes  Blood in stool: No  Black stools: No  Rectal or Anal pain: No  Fecal incontinence: No  Yellowing of skin or eyes: No  Vomit with blood: No  Change in stools: No  Trouble holding urine or incontinence: No  Pain or burning: No  Trouble starting or stopping: No  Increased frequency of urination: No  Blood in urine: No  Decreased frequency of urination: No  Frequent nighttime urination: Yes  Flank pain: No  Difficulty emptying bladder: No  Scrotal pain or swelling: Yes  Erectile dysfunction: Yes  Penile discharge: No  Genital ulcers: No  Reduced libido: No    Vital Signs     BP (!) 148/68 (BP Location: Right arm, Patient Position: Sitting, Cuff Size: Adult Large)   Pulse 60   Wt 124.7 kg (275 lb)   SpO2 94%   BMI 36.28 kg/m    Wt  Readings from Last 3 Encounters:   07/25/23 124.7 kg (275 lb)   03/15/23 126.7 kg (279 lb 6.4 oz)   12/07/22 121.3 kg (267 lb 8 oz)       Physical Exam     Constitutional:  Well developed, Well nourished  HENT:  Normocephalic,   Neck: Thyroid normal, No lymph nodes, Supple  Eyes:  PERRL, Conjunctiva pink  Respiratory:  Normal breath sounds, No respiratory distress  Cardiovascular:  Normal heart rate, Normal rhythm, No murmurs  GI:  Bowel sounds normal, Soft, No tenderness  Musculoskeletal:  No gross deformity or lesions, normal dorsalis pedis pulses  Skin: No acanthosis nigricans, lipoatrophy or lipodystrophy  Neurologic:  Alert & oriented x 3, nonfocal  Psychiatric:  Affect, Mood, Insight appropriate        Assessment     1. Type 2 diabetes mellitus with complication, with long-term current use of insulin (H)        Plan     Pt is not terribly interested in adding more medication. He feels that he is paying attention, and that likely his next A1c will be improved from the one in March. He is content with the fact that his life is now settling back down after a very stressful and busy year with his mother's affairs. He is also comfortable staying with his PCP, and really doesn't want to work with Specialties, because he doesn't think that it is necessary.      I was happy to meet him today, and discuss his plans of further improvement in his BG management. I suggest no changes today. In the future, perhaps working with MTM regularly on getting rid of some of his insulin, and perhaps starting a GLP1.     I will see him back PRN.     45 minutes spent in chart and lab review, medical decision making, and face to face.       Lor Carpenter NP  [facility]in[facility]  7/25/2023  2:52 PM      Lab Results     Microalbumin Urine mg/dL   Date Value Ref Range Status   07/10/2020 10.83 (H) 0.00 - 1.99 mg/dL Final       Cholesterol   Date Value Ref Range Status   08/24/2022 116 <200 mg/dL Final     Direct Measure HDL   Date Value Ref  "Range Status   08/24/2022 39 (L) >=40 mg/dL Final     Triglycerides   Date Value Ref Range Status   08/24/2022 79 <150 mg/dL Final       [unfilled]      Current Medications     Outpatient Medications Prior to Visit   Medication Sig Dispense Refill    amLODIPine (NORVASC) 10 MG tablet Take 1 tablet (10 mg) by mouth daily 90 tablet 4    ASCORBIC ACID, VITAMIN C, ORAL [ASCORBIC ACID, VITAMIN C, ORAL] Take 1 tablet by mouth daily.            aspirin (ASA) 81 MG chewable tablet Take 1 tablet (81 mg) by mouth daily 90 tablet 4    atorvastatin (LIPITOR) 10 MG tablet Take 1 tablet (10 mg) by mouth daily 90 tablet 4    B-D U/F insulin pen needle [BD ULTRA-FINE MINI PEN NEEDLE 31 GAUGE X 3/16\" NDLE] use 4 to 5 times daily with insulin 450 each 4    blood glucose (ONETOUCH ULTRA) test strip [BLOOD GLUCOSE TEST (ONETOUCH ULTRA BLUE TEST STRIP) STRIPS] Test 3 times a day, dx E11.65, on insulin, 90 day supply 300 strip 3    carica papaya (PAPAYA ENZYME) Tab [CARICA PAPAYA (PAPAYA ENZYME) TAB] Take 1 tablet by mouth daily.            cholecalciferol, vitamin D3, 5,000 unit Tab [CHOLECALCIFEROL, VITAMIN D3, 5,000 UNIT TAB] Take 5,000 Units by mouth daily.      CYANOCOBALAMIN, VITAMIN B-12, ORAL [CYANOCOBALAMIN, VITAMIN B-12, ORAL] Take 1 tablet by mouth daily.            empagliflozin (JARDIANCE) 10 MG TABS tablet Take 1 tablet (10 mg) by mouth daily 90 tablet 4    insulin glargine (LANTUS SOLOSTAR) 100 UNIT/ML pen INJECT SUBCUTANEOUSLY 30  UNITS IN THE MORNING AND 60 UNITS AT BEDTIME 90 mL 4    insulin lispro (HUMALOG KWIKPEN) 100 UNIT/ML (1 unit dial) KWIKPEN INJECT 1 UNITS PER 3 GRAMS OF CARBOHYDRATE PLUS CORRECTION WITH EACH MEAL FOR TYPE 2 WITH HYPERGLYCEMIA. Maximum of 50 units per day. 15 mL 3    lancets (ONETOUCH DELICA LANCETS) 33 gauge Misc [LANCETS (ONETOUCH DELICA LANCETS) 33 GAUGE MISC] Testing 3 times daily, Dx code 250.00 90 day supply,  Formulary change send after 1/1/14 300 each 3    metFORMIN (GLUCOPHAGE) 1000 MG " tablet Take 1 tablet (1,000 mg) by mouth 2 times daily (with meals) 180 tablet 4    metoprolol succinate ER (TOPROL XL) 50 MG 24 hr tablet Take 1 tablet (50 mg) by mouth daily 90 tablet 4    multivitamin-Ca-iron-minerals Tab [MULTIVITAMIN-CA-IRON-MINERALS TAB] Take 1 tablet by mouth daily.            POTASSIUM CITRATE ORAL [POTASSIUM CITRATE ORAL] Take 2 tablets by mouth daily.            valsartan-hydrochlorothiazide (DIOVAN HCT) 320-25 MG tablet Take 1 tablet by mouth daily 90 tablet 4     No facility-administered medications prior to visit.

## 2023-07-25 NOTE — LETTER
"    7/25/2023         RE: Artem Pryor  [address]  [facility] [address]        Dear Colleague,    Thank you for referring your patient, Artem Pryor, to the [facility]. Please see a copy of my visit note below.          [facility] ENDOCRINOLOGY    Diabetes Note 7/25/2023    Artem Pryor, 1955, [id number]          Reason for visit      1. Type 2 diabetes mellitus with complication, with long-term current use of insulin (H)        HPI     Artem Pryor is a very pleasant 67 year old old male who presents for follow up.  SUMMARY:    Artem is here today in referral for DM 2. He was dx in 2003. Extensive fm hx of DM, including Type 2 in Grandmother, Father, and a brother, who is in Skilled Care. His Youngest Brother had Type 1, and has passed because of complications.     His most recent A1c was 8.0, and unfortunately was done in the middle of March. This was up quite a bit from his previous of 6.8.    He is reporting that the period of time prior to the 6.8 was full of a lot of physical work. He has just laid his Mother to rest, and at the time of the low A1c, he was \"digging out her house\" (he reports that she was hoarding) as she went to a Facility.  He states that the whole year was tough on him as she was deteriorating. He was doing a lot of yard work as well at the time.     He has been taking Lantus, 25-30 units in the morning and 45-60 units in the evening. He has been experimenting with his evening dose in working on his FBS. He is aware of the body's propensity to increase BG in early morning. Sometimes he eats a high protein snack at HS and sometimes he does not. He uses Humalog with meals, with a 1:3 CHO ratio. He takes Jardiance, 10 mg daily, as well as Metformin, 1000 mg BID.     BP slightly elevated today. He is on a Calcium Channel blocker, as well as ARB with hydrochlorothiazide.  Most recent Renal Panel was done in August of 2022. Normal function at that " "time. Hepatic function too, was within normal range.     Pt is on a Statin. Lipid Profile showed just below normal HDL. Triglycerides were 79, LDL was 61.     He endorses Neuropathy in both feet in the toes. This not problematic for him at night.     He had his last eye exam done in March of this year. He notes that he has been told that he has \"a lot of floater\". He has had Laser treatments in his R eye \"a long time ago\".       Blood glucose data:      Past Medical History     Patient Active Problem List   Diagnosis     Type 2 diabetes mellitus with complication, with long-term current use of insulin (H)     Essential hypertension     Hyperlipidemia with target LDL less than 100     Microalbuminuria     Vitamin D deficiency     Diabetic polyneuropathy associated with type 2 diabetes mellitus (H)     Monocytosis     Mild nonproliferative diabetic retinopathy (H)     Class 2 severe obesity with serious comorbidity and body mass index (BMI) of 36.0 to 36.9 in adult, unspecified obesity type (H)     Other hydrocele     Encounter for preventive care     Impotence        Family History       family history includes Breast Cancer in his father and mother; Cancer in his brother; Cerebrovascular Disease in his mother and paternal grandfather; Colon Cancer in his maternal grandfather; Colon Cancer (age of onset: 85) in his maternal uncle; Diabetes in his brother and paternal grandmother; Heart Failure in his father; Hypertension in his father and mother; Melanoma (age of onset: 70) in his father; Osteoporosis in his mother; Other Cancer in his brother; Prostate Cancer in his father; Thyroid Cancer (age of onset: 25) in his maternal cousin.    Social History      reports that he quit smoking about 45 years ago. His smoking use included cigarettes. He has never used smokeless tobacco. He reports current alcohol use. He reports that he does not use drugs.      Review of Systems     Patient has no polyuria or polydipsia, no " chest pain, dyspnea or TIA's, no numbness, tingling or pain in extremities  Remainder negative except as noted in HPI.    Answers for HPI/ROS submitted by the patient on 7/19/2023  General Symptoms: No  Skin Symptoms: No  HENT Symptoms: No  EYE SYMPTOMS: Yes  HEART SYMPTOMS: Yes  LUNG SYMPTOMS: Yes  INTESTINAL SYMPTOMS: Yes  URINARY SYMPTOMS: Yes  REPRODUCTIVE SYMPTOMS: Yes  SKELETAL SYMPTOMS: No  BLOOD SYMPTOMS: No  NERVOUS SYSTEM SYMPTOMS: No  MENTAL HEALTH SYMPTOMS: No  Eye pain: No  Vision loss: No  Dry eyes: No  Watery eyes: No  Eye bulging: No  Double vision: No  Flashing of lights: No  Spots: No  Floaters: Yes  Redness: No  Crossed eyes: No  Tunnel Vision: No  Yellowing of eyes: No  Eye irritation: No  Chest pain or pressure: No  Fast or irregular heartbeat: No  Pain in legs with walking: No  Trouble breathing while lying down: No  Fingers or toes appear blue: No  High blood pressure: Yes  Low blood pressure: No  Fainting: No  Murmurs: No  Pacemaker: No  Varicose veins: No  Edema or swelling: No  Wake up at night with shortness of breath: No  Light-headedness: No  Exercise intolerance: No  Cough: Yes  Sputum or phlegm: Yes  Coughing up blood: No  Difficulty breating or shortness of breath: No  Snoring: Yes  Wheezing: No  Difficulty breathing on exertion: No  Nighttime Cough: No  Difficulty breathing when lying flat: No  Heart burn or indigestion: Yes  Nausea: No  Vomiting: No  Abdominal pain: No  Bloating: No  Constipation: No  Diarrhea: Yes  Blood in stool: No  Black stools: No  Rectal or Anal pain: No  Fecal incontinence: No  Yellowing of skin or eyes: No  Vomit with blood: No  Change in stools: No  Trouble holding urine or incontinence: No  Pain or burning: No  Trouble starting or stopping: No  Increased frequency of urination: No  Blood in urine: No  Decreased frequency of urination: No  Frequent nighttime urination: Yes  Flank pain: No  Difficulty emptying bladder: No  Scrotal pain or swelling:  Yes  Erectile dysfunction: Yes  Penile discharge: No  Genital ulcers: No  Reduced libido: No    Vital Signs     BP (!) 148/68 (BP Location: Right arm, Patient Position: Sitting, Cuff Size: Adult Large)   Pulse 60   Wt 124.7 kg (275 lb)   SpO2 94%   BMI 36.28 kg/m    Wt Readings from Last 3 Encounters:   07/25/23 124.7 kg (275 lb)   03/15/23 126.7 kg (279 lb 6.4 oz)   12/07/22 121.3 kg (267 lb 8 oz)       Physical Exam     Constitutional:  Well developed, Well nourished  HENT:  Normocephalic,   Neck: Thyroid normal, No lymph nodes, Supple  Eyes:  PERRL, Conjunctiva pink  Respiratory:  Normal breath sounds, No respiratory distress  Cardiovascular:  Normal heart rate, Normal rhythm, No murmurs  GI:  Bowel sounds normal, Soft, No tenderness  Musculoskeletal:  No gross deformity or lesions, normal dorsalis pedis pulses  Skin: No acanthosis nigricans, lipoatrophy or lipodystrophy  Neurologic:  Alert & oriented x 3, nonfocal  Psychiatric:  Affect, Mood, Insight appropriate        Assessment     1. Type 2 diabetes mellitus with complication, with long-term current use of insulin (H)        Plan     Pt is not terribly interested in adding more medication. He feels that he is paying attention, and that likely his next A1c will be improved from the one in March. He is content with the fact that his life is now settling back down after a very stressful and busy year with his mother's affairs. He is also comfortable staying with his PCP, and really doesn't want to work with Specialties, because he doesn't think that it is necessary.      I was happy to meet him today, and discuss his plans of further improvement in his BG management. I suggest no changes today. In the future, perhaps working with MTM regularly on getting rid of some of his insulin, and perhaps starting a GLP1.     I will see him back PRN.     45 minutes spent in chart and lab review, medical decision making, and face to face.       Lor Carpenter NP  [facility]  "Endocrinology  7/25/2023  2:52 PM      Lab Results     Microalbumin Urine mg/dL   Date Value Ref Range Status   07/10/2020 10.83 (H) 0.00 - 1.99 mg/dL Final       Cholesterol   Date Value Ref Range Status   08/24/2022 116 <200 mg/dL Final     Direct Measure HDL   Date Value Ref Range Status   08/24/2022 39 (L) >=40 mg/dL Final     Triglycerides   Date Value Ref Range Status   08/24/2022 79 <150 mg/dL Final       [unfilled]      Current Medications     Outpatient Medications Prior to Visit   Medication Sig Dispense Refill     amLODIPine (NORVASC) 10 MG tablet Take 1 tablet (10 mg) by mouth daily 90 tablet 4     ASCORBIC ACID, VITAMIN C, ORAL [ASCORBIC ACID, VITAMIN C, ORAL] Take 1 tablet by mouth daily.             aspirin (ASA) 81 MG chewable tablet Take 1 tablet (81 mg) by mouth daily 90 tablet 4     atorvastatin (LIPITOR) 10 MG tablet Take 1 tablet (10 mg) by mouth daily 90 tablet 4     B-D U/F insulin pen needle [BD ULTRA-FINE MINI PEN NEEDLE 31 GAUGE X 3/16\" NDLE] use 4 to 5 times daily with insulin 450 each 4     blood glucose (ONETOUCH ULTRA) test strip [BLOOD GLUCOSE TEST (ONETOUCH ULTRA BLUE TEST STRIP) STRIPS] Test 3 times a day, dx E11.65, on insulin, 90 day supply 300 strip 3     carica papaya (PAPAYA ENZYME) Tab [CARICA PAPAYA (PAPAYA ENZYME) TAB] Take 1 tablet by mouth daily.             cholecalciferol, vitamin D3, 5,000 unit Tab [CHOLECALCIFEROL, VITAMIN D3, 5,000 UNIT TAB] Take 5,000 Units by mouth daily.       CYANOCOBALAMIN, VITAMIN B-12, ORAL [CYANOCOBALAMIN, VITAMIN B-12, ORAL] Take 1 tablet by mouth daily.             empagliflozin (JARDIANCE) 10 MG TABS tablet Take 1 tablet (10 mg) by mouth daily 90 tablet 4     insulin glargine (LANTUS SOLOSTAR) 100 UNIT/ML pen INJECT SUBCUTANEOUSLY 30  UNITS IN THE MORNING AND 60 UNITS AT BEDTIME 90 mL 4     insulin lispro (HUMALOG KWIKPEN) 100 UNIT/ML (1 unit dial) KWIKPEN INJECT 1 UNITS PER 3 GRAMS OF CARBOHYDRATE PLUS CORRECTION WITH EACH MEAL FOR TYPE 2 " WITH HYPERGLYCEMIA. Maximum of 50 units per day. 15 mL 3     lancets (ONETOUCH DELICA LANCETS) 33 gauge Misc [LANCETS (ONETOUCH DELICA LANCETS) 33 GAUGE MISC] Testing 3 times daily, Dx code 250.00 90 day supply,  Formulary change send after 1/1/14 300 each 3     metFORMIN (GLUCOPHAGE) 1000 MG tablet Take 1 tablet (1,000 mg) by mouth 2 times daily (with meals) 180 tablet 4     metoprolol succinate ER (TOPROL XL) 50 MG 24 hr tablet Take 1 tablet (50 mg) by mouth daily 90 tablet 4     multivitamin-Ca-iron-minerals Tab [MULTIVITAMIN-CA-IRON-MINERALS TAB] Take 1 tablet by mouth daily.             POTASSIUM CITRATE ORAL [POTASSIUM CITRATE ORAL] Take 2 tablets by mouth daily.             valsartan-hydrochlorothiazide (DIOVAN HCT) 320-25 MG tablet Take 1 tablet by mouth daily 90 tablet 4     No facility-administered medications prior to visit.                                 Again, thank you for allowing me to participate in the care of your patient.        Sincerely,        Lor Carpenter NP

## 2023-09-01 ENCOUNTER — TELEPHONE (OUTPATIENT)
Dept: FAMILY MEDICINE | Facility: CLINIC | Age: 68
End: 2023-09-01
Payer: COMMERCIAL

## 2023-09-01 NOTE — TELEPHONE ENCOUNTER
Patient Quality Outreach    Patient is due for the following:   Diabetes -  A1C  Hypertension -  BP check    Next Steps:   Schedule a office visit for 9/19/23    Type of outreach:    Phone, spoke to patient/parent. Patient    Next Steps:  Reach out within 90 days via Phone.    Max number of attempts reached: No. Will try again in 90 days if patient still on fail list.    Questions for provider review:    None           Yaya Browning MA  Chart routed to Self.

## 2023-09-19 ENCOUNTER — OFFICE VISIT (OUTPATIENT)
Dept: FAMILY MEDICINE | Facility: CLINIC | Age: 68
End: 2023-09-19
Payer: COMMERCIAL

## 2023-09-19 VITALS
DIASTOLIC BLOOD PRESSURE: 81 MMHG | BODY MASS INDEX: 36.81 KG/M2 | WEIGHT: 279 LBS | OXYGEN SATURATION: 97 % | RESPIRATION RATE: 18 BRPM | HEART RATE: 53 BPM | SYSTOLIC BLOOD PRESSURE: 148 MMHG

## 2023-09-19 DIAGNOSIS — E78.5 HYPERLIPIDEMIA WITH TARGET LDL LESS THAN 100: ICD-10-CM

## 2023-09-19 DIAGNOSIS — Z79.4 TYPE 2 DIABETES MELLITUS WITH COMPLICATION, WITH LONG-TERM CURRENT USE OF INSULIN (H): Primary | ICD-10-CM

## 2023-09-19 DIAGNOSIS — E11.3299 MILD NONPROLIFERATIVE DIABETIC RETINOPATHY ASSOCIATED WITH TYPE 2 DIABETES MELLITUS, MACULAR EDEMA PRESENCE UNSPECIFIED, UNSPECIFIED LATERALITY (H): ICD-10-CM

## 2023-09-19 DIAGNOSIS — E11.8 TYPE 2 DIABETES MELLITUS WITH COMPLICATION, WITH LONG-TERM CURRENT USE OF INSULIN (H): Primary | ICD-10-CM

## 2023-09-19 DIAGNOSIS — E66.812 CLASS 2 SEVERE OBESITY WITH SERIOUS COMORBIDITY AND BODY MASS INDEX (BMI) OF 36.0 TO 36.9 IN ADULT, UNSPECIFIED OBESITY TYPE (H): ICD-10-CM

## 2023-09-19 DIAGNOSIS — I10 ESSENTIAL HYPERTENSION: ICD-10-CM

## 2023-09-19 DIAGNOSIS — E66.01 CLASS 2 SEVERE OBESITY WITH SERIOUS COMORBIDITY AND BODY MASS INDEX (BMI) OF 36.0 TO 36.9 IN ADULT, UNSPECIFIED OBESITY TYPE (H): ICD-10-CM

## 2023-09-19 LAB
ALBUMIN SERPL BCG-MCNC: 4.5 G/DL (ref 3.5–5.2)
ALP SERPL-CCNC: 89 U/L (ref 40–129)
ALT SERPL W P-5'-P-CCNC: 42 U/L (ref 0–70)
ANION GAP SERPL CALCULATED.3IONS-SCNC: 15 MMOL/L (ref 7–15)
AST SERPL W P-5'-P-CCNC: 28 U/L (ref 0–45)
BILIRUB SERPL-MCNC: 0.4 MG/DL
BUN SERPL-MCNC: 15.6 MG/DL (ref 8–23)
CALCIUM SERPL-MCNC: 9.7 MG/DL (ref 8.8–10.2)
CHLORIDE SERPL-SCNC: 101 MMOL/L (ref 98–107)
CHOLEST SERPL-MCNC: 144 MG/DL
CREAT SERPL-MCNC: 1.02 MG/DL (ref 0.67–1.17)
CREAT UR-MCNC: 111 MG/DL
DEPRECATED HCO3 PLAS-SCNC: 26 MMOL/L (ref 22–29)
EGFRCR SERPLBLD CKD-EPI 2021: 81 ML/MIN/1.73M2
GLUCOSE SERPL-MCNC: 117 MG/DL (ref 70–99)
HBA1C MFR BLD: 7.5 % (ref 0–5.6)
HDLC SERPL-MCNC: 43 MG/DL
HOLD SPECIMEN: NORMAL
LDLC SERPL CALC-MCNC: 77 MG/DL
MICROALBUMIN UR-MCNC: 230 MG/L
MICROALBUMIN/CREAT UR: 207.21 MG/G CR (ref 0–17)
NONHDLC SERPL-MCNC: 101 MG/DL
POTASSIUM SERPL-SCNC: 4.4 MMOL/L (ref 3.4–5.3)
PROT SERPL-MCNC: 7.4 G/DL (ref 6.4–8.3)
SODIUM SERPL-SCNC: 142 MMOL/L (ref 136–145)
TRIGL SERPL-MCNC: 121 MG/DL

## 2023-09-19 PROCEDURE — 82570 ASSAY OF URINE CREATININE: CPT | Performed by: FAMILY MEDICINE

## 2023-09-19 PROCEDURE — 82043 UR ALBUMIN QUANTITATIVE: CPT | Performed by: FAMILY MEDICINE

## 2023-09-19 PROCEDURE — 90662 IIV NO PRSV INCREASED AG IM: CPT | Performed by: FAMILY MEDICINE

## 2023-09-19 PROCEDURE — G0008 ADMIN INFLUENZA VIRUS VAC: HCPCS | Performed by: FAMILY MEDICINE

## 2023-09-19 PROCEDURE — 99214 OFFICE O/P EST MOD 30 MIN: CPT | Mod: 25 | Performed by: FAMILY MEDICINE

## 2023-09-19 PROCEDURE — 80061 LIPID PANEL: CPT | Performed by: FAMILY MEDICINE

## 2023-09-19 PROCEDURE — 80053 COMPREHEN METABOLIC PANEL: CPT | Performed by: FAMILY MEDICINE

## 2023-09-19 PROCEDURE — 83036 HEMOGLOBIN GLYCOSYLATED A1C: CPT | Performed by: FAMILY MEDICINE

## 2023-09-19 PROCEDURE — 36415 COLL VENOUS BLD VENIPUNCTURE: CPT | Performed by: FAMILY MEDICINE

## 2023-09-19 NOTE — ASSESSMENT & PLAN NOTE
Class II obesity with chronic comorbid weight related conditions including diabetes, hypertension, diabetic retinopathy.      Recommended Mounjaro which would help with both diabetes and weight loss and potentially improve multiple medical issues he has.  He is going to check with his insurance formulary to see if this is affordable and would consider depending on cost.

## 2023-09-19 NOTE — ASSESSMENT & PLAN NOTE
Type 2 diabetes with complication of retinopathy.  Planning to see the eye doctor this Thursday.  A1c is down from 8.0 in March 2020 23-7.5 today, 9/15/2023.    He had been seeing endocrinologist but declines going back to the endocrinologist saying he does not want to see specialist.    He continues to take aspirin, Lipitor, is on valsartan for ARB protection of kidneys.    Unfortunately his blood pressure is elevated today at 148/81 and I would like to get this down a bit more however he is not interested in medication changes today.    I also suggested starting Mounjaro and trying to taper down on insulin and increase GLP-1 receptor agonist but he is not interested in this at this time thinking that perhaps the formulary will not cover.  He is going to call his insurance company.    Currently he will continue Humalog insulin, Lantus insulin, Jardiance 10 mg p.o. daily, metformin 1000 mg twice daily.    And follow-up in 3 months.

## 2023-09-19 NOTE — ASSESSMENT & PLAN NOTE
Retinopathy-planning to see ophthalmologist on Thursday.  We discussed starting Mounjaro today to get better control of his diabetes but he is not interested in making this change at this time but will consider it for the future.

## 2023-09-19 NOTE — PROGRESS NOTES
Assessment & Plan   Problem List Items Addressed This Visit          Endocrine    Type 2 diabetes mellitus with complication, with long-term current use of insulin (H) - Primary     Type 2 diabetes with complication of retinopathy.  Planning to see the eye doctor this Thursday.  A1c is down from 8.0 in March 2020 23-7.5 today, 9/15/2023.    He had been seeing endocrinologist but declines going back to the endocrinologist saying he does not want to see specialist.    He continues to take aspirin, Lipitor, is on valsartan for ARB protection of kidneys.    Unfortunately his blood pressure is elevated today at 148/81 and I would like to get this down a bit more however he is not interested in medication changes today.    I also suggested starting Mounjaro and trying to taper down on insulin and increase GLP-1 receptor agonist but he is not interested in this at this time thinking that perhaps the formulary will not cover.  He is going to call his insurance company.    Currently he will continue Humalog insulin, Lantus insulin, Jardiance 10 mg p.o. daily, metformin 1000 mg twice daily.    And follow-up in 3 months.         Relevant Orders    Hemoglobin A1c (Completed)    Lipid panel reflex to direct LDL Non-fasting    Albumin Random Urine Quantitative with Creat Ratio    Hyperlipidemia with target LDL less than 100     Hyperlipidemia-continue Lipitor 10 mg p.o. daily.         Mild nonproliferative diabetic retinopathy (H)     Retinopathy-planning to see ophthalmologist on Thursday.  We discussed starting Mounjaro today to get better control of his diabetes but he is not interested in making this change at this time but will consider it for the future.            Circulatory    Essential hypertension     Uncontrolled hypertension with blood pressure 148/81 today.  Currently on amlodipine 10 mg p.o. daily, metoprolol 50 mg extended release daily, and Diovan 320/25 mg p.o. daily.  We discussed medication changes today to  "try to optimize his blood pressure but he is not interested.  I did discuss him meeting with a nephrologist to optimize his blood pressure and he said he would take the phone number.    Pathophysiology of hypertension and why this is important to control is discussed today.      Plan follow-up on his blood pressure at his next visit in 3 months when he is due to have his diabetes rechecked.         Relevant Orders    Comprehensive metabolic panel (BMP + Alb, Alk Phos, ALT, AST, Total. Bili, TP)    Adult Nephrology  Referral           BMI:   Estimated body mass index is 36.81 kg/m  as calculated from the following:    Height as of 3/15/23: 1.854 m (6' 1\").    Weight as of this encounter: 126.6 kg (279 lb).   Weight management plan: Discussed healthy diet and exercise guidelines  Blood sugar testing frequency justification:  Uncontrolled diabetes  See Patient Instructions    Regi Marroquin MD  [facility] CARLEY Mcgill is a 68 year old, presenting for the following health issues:  Diabetes        9/19/2023    10:19 AM   Additional Questions   Roomed by as   Accompanied by self         9/19/2023    10:19 AM   Patient Reported Additional Medications   Patient reports taking the following new medications no           Objective    BP (!) 148/81 (BP Location: Left arm, Patient Position: Left side, Cuff Size: Adult Large)   Pulse 53   Resp 18   Wt 126.6 kg (279 lb)   SpO2 97%   BMI 36.81 kg/m    Body mass index is 36.81 kg/m .  Physical Exam  Constitutional:       Appearance: Normal appearance.   HENT:      Head: Normocephalic and atraumatic.   Cardiovascular:      Rate and Rhythm: Normal rate and regular rhythm.   Pulmonary:      Effort: Pulmonary effort is normal.   Musculoskeletal:         General: Normal range of motion.      Cervical back: Normal range of motion and neck supple.   Neurological:      General: No focal deficit present.      Mental Status: He is alert.      "       Answers submitted by the patient for this visit:  Diabetes Visit (Submitted on 9/18/2023)  Chief Complaint: Chronic problems general questions HPI Form  Frequency of checking blood sugars:: two times daily  What time of day are you checking your blood sugars : before meals, after meals  Have you had any blood sugars above 200?: No  Have you had any blood sugars below 70?: No  Hypoglycemia symptoms:: shakiness, blurred vision  Diabetic concerns:: none  Paraesthesia present:: numbness in feet  General Questionnaire (Submitted on 9/18/2023)  Chief Complaint: Chronic problems general questions HPI Form  How many servings of fruits and vegetables do you eat daily?: 2-3  On average, how many sweetened beverages do you drink each day (Examples: soda, juice, sweet tea, etc.  Do NOT count diet or artificially sweetened beverages)?: 0  How many minutes a day do you exercise enough to make your heart beat faster?: 10 to 19  How many days a week do you exercise enough to make your heart beat faster?: 4  How many days per week do you miss taking your medication?: 0

## 2023-09-19 NOTE — ASSESSMENT & PLAN NOTE
>>ASSESSMENT AND PLAN FOR TYPE 2 DIABETES MELLITUS WITH COMPLICATION, WITH LONG-TERM CURRENT USE OF INSULIN (H) WRITTEN ON 9/19/2023 11:13 AM BY KANG BOBO MD    Type 2 diabetes with complication of retinopathy.  Planning to see the eye doctor this Thursday.  A1c is down from 8.0 in March 2020 23-7.5 today, 9/15/2023.    He had been seeing endocrinologist but declines going back to the endocrinologist saying he does not want to see specialist.    He continues to take aspirin, Lipitor, is on valsartan for ARB protection of kidneys.    Unfortunately his blood pressure is elevated today at 148/81 and I would like to get this down a bit more however he is not interested in medication changes today.    I also suggested starting Mounjaro and trying to taper down on insulin and increase GLP-1 receptor agonist but he is not interested in this at this time thinking that perhaps the formulary will not cover.  He is going to call his insurance company.    Currently he will continue Humalog insulin, Lantus insulin, Jardiance 10 mg p.o. daily, metformin 1000 mg twice daily.    And follow-up in 3 months.

## 2023-09-19 NOTE — ASSESSMENT & PLAN NOTE
Uncontrolled hypertension with blood pressure 148/81 today.  Currently on amlodipine 10 mg p.o. daily, metoprolol 50 mg extended release daily, and Diovan 320/25 mg p.o. daily.  We discussed medication changes today to try to optimize his blood pressure but he is not interested.  I did discuss him meeting with a nephrologist to optimize his blood pressure and he said he would take the phone number.    Pathophysiology of hypertension and why this is important to control is discussed today.      Plan follow-up on his blood pressure at his next visit in 3 months when he is due to have his diabetes rechecked.

## 2023-09-19 NOTE — PROGRESS NOTES
Answers submitted by the patient for this visit:  Diabetes Visit (Submitted on 9/18/2023)  Chief Complaint: Chronic problems general questions HPI Form  Frequency of checking blood sugars:: two times daily  What time of day are you checking your blood sugars : before meals, after meals  Have you had any blood sugars above 200?: No  Have you had any blood sugars below 70?: No  Hypoglycemia symptoms:: shakiness, blurred vision  Diabetic concerns:: none  Paraesthesia present:: numbness in feet  General Questionnaire (Submitted on 9/18/2023)  Chief Complaint: Chronic problems general questions HPI Form  How many servings of fruits and vegetables do you eat daily?: 2-3  On average, how many sweetened beverages do you drink each day (Examples: soda, juice, sweet tea, etc.  Do NOT count diet or artificially sweetened beverages)?: 0  How many minutes a day do you exercise enough to make your heart beat faster?: 10 to 19  How many days a week do you exercise enough to make your heart beat faster?: 4  How many days per week do you miss taking your medication?: 0

## 2023-09-21 ENCOUNTER — TRANSFERRED RECORDS (OUTPATIENT)
Dept: HEALTH INFORMATION MANAGEMENT | Facility: CLINIC | Age: 68
End: 2023-09-21
Payer: COMMERCIAL

## 2023-09-21 LAB — RETINOPATHY: POSITIVE

## 2023-09-26 ENCOUNTER — TELEPHONE (OUTPATIENT)
Dept: NEPHROLOGY | Facility: CLINIC | Age: 68
End: 2023-09-26
Payer: COMMERCIAL

## 2023-09-26 DIAGNOSIS — I10 ESSENTIAL HYPERTENSION: Primary | ICD-10-CM

## 2023-09-26 NOTE — TELEPHONE ENCOUNTER
[facility] Call Center    Phone Message    May a detailed message be left on voicemail: yes     Reason for Call: Order(s): Other:   Reason for requested: LABS  Date needed: 1/29/2024  Provider name: Yehuda    Action Taken: Message routed to:  Other: [facility]    Travel Screening: Not Applicable

## 2023-11-02 DIAGNOSIS — Z79.4 TYPE 2 DIABETES MELLITUS WITH COMPLICATION, WITH LONG-TERM CURRENT USE OF INSULIN (H): ICD-10-CM

## 2023-11-02 DIAGNOSIS — I10 ESSENTIAL HYPERTENSION: ICD-10-CM

## 2023-11-02 DIAGNOSIS — E11.8 TYPE 2 DIABETES MELLITUS WITH COMPLICATION, WITH LONG-TERM CURRENT USE OF INSULIN (H): ICD-10-CM

## 2023-11-02 DIAGNOSIS — E78.5 HYPERLIPIDEMIA WITH TARGET LDL LESS THAN 100: ICD-10-CM

## 2023-11-02 RX ORDER — METOPROLOL SUCCINATE 50 MG/1
50 TABLET, EXTENDED RELEASE ORAL DAILY
Qty: 90 TABLET | Refills: 0 | Status: SHIPPED | OUTPATIENT
Start: 2023-11-02 | End: 2024-01-31

## 2023-11-02 RX ORDER — EMPAGLIFLOZIN 10 MG/1
10 TABLET, FILM COATED ORAL DAILY
Qty: 90 TABLET | Refills: 0 | Status: SHIPPED | OUTPATIENT
Start: 2023-11-02 | End: 2024-01-31

## 2023-11-02 RX ORDER — AMLODIPINE BESYLATE 10 MG/1
10 TABLET ORAL DAILY
Qty: 90 TABLET | Refills: 0 | Status: SHIPPED | OUTPATIENT
Start: 2023-11-02 | End: 2024-01-31

## 2023-11-02 RX ORDER — ATORVASTATIN CALCIUM 10 MG/1
10 TABLET, FILM COATED ORAL DAILY
Qty: 90 TABLET | Refills: 3 | Status: SHIPPED | OUTPATIENT
Start: 2023-11-02 | End: 2024-01-31

## 2023-11-02 RX ORDER — VALSARTAN AND HYDROCHLOROTHIAZIDE 320; 25 MG/1; MG/1
1 TABLET, FILM COATED ORAL DAILY
Qty: 90 TABLET | Refills: 0 | Status: SHIPPED | OUTPATIENT
Start: 2023-11-02 | End: 2024-01-31

## 2023-11-05 ENCOUNTER — MYC REFILL (OUTPATIENT)
Dept: FAMILY MEDICINE | Facility: CLINIC | Age: 68
End: 2023-11-05
Payer: COMMERCIAL

## 2023-11-05 DIAGNOSIS — Z79.4 TYPE 2 DIABETES MELLITUS WITH COMPLICATION, WITH LONG-TERM CURRENT USE OF INSULIN (H): ICD-10-CM

## 2023-11-05 DIAGNOSIS — E11.8 TYPE 2 DIABETES MELLITUS WITH COMPLICATION, WITH LONG-TERM CURRENT USE OF INSULIN (H): ICD-10-CM

## 2023-11-05 DIAGNOSIS — E78.5 HYPERLIPIDEMIA WITH TARGET LDL LESS THAN 100: ICD-10-CM

## 2023-11-05 DIAGNOSIS — I10 ESSENTIAL HYPERTENSION: ICD-10-CM

## 2023-11-06 RX ORDER — INSULIN LISPRO 100 [IU]/ML
INJECTION, SOLUTION INTRAVENOUS; SUBCUTANEOUS
Qty: 15 ML | Refills: 1 | Status: SHIPPED | OUTPATIENT
Start: 2023-11-06 | End: 2024-01-31

## 2023-11-06 RX ORDER — VALSARTAN AND HYDROCHLOROTHIAZIDE 320; 25 MG/1; MG/1
1 TABLET, FILM COATED ORAL DAILY
Qty: 90 TABLET | Refills: 0 | OUTPATIENT
Start: 2023-11-06

## 2023-11-06 RX ORDER — METOPROLOL SUCCINATE 50 MG/1
50 TABLET, EXTENDED RELEASE ORAL DAILY
Qty: 90 TABLET | Refills: 0 | OUTPATIENT
Start: 2023-11-06

## 2023-11-06 RX ORDER — ATORVASTATIN CALCIUM 10 MG/1
10 TABLET, FILM COATED ORAL DAILY
Qty: 90 TABLET | Refills: 3 | OUTPATIENT
Start: 2023-11-06

## 2023-11-06 RX ORDER — AMLODIPINE BESYLATE 10 MG/1
10 TABLET ORAL DAILY
Qty: 90 TABLET | Refills: 0 | OUTPATIENT
Start: 2023-11-06

## 2023-11-06 RX ORDER — INSULIN GLARGINE 100 [IU]/ML
INJECTION, SOLUTION SUBCUTANEOUS
Qty: 90 ML | Refills: 0 | OUTPATIENT
Start: 2023-11-06

## 2023-11-08 ENCOUNTER — PATIENT OUTREACH (OUTPATIENT)
Dept: CARE COORDINATION | Facility: CLINIC | Age: 68
End: 2023-11-08
Payer: COMMERCIAL

## 2023-11-22 ENCOUNTER — PATIENT OUTREACH (OUTPATIENT)
Dept: CARE COORDINATION | Facility: CLINIC | Age: 68
End: 2023-11-22
Payer: COMMERCIAL

## 2023-12-20 ENCOUNTER — OFFICE VISIT (OUTPATIENT)
Dept: FAMILY MEDICINE | Facility: CLINIC | Age: 68
End: 2023-12-20
Payer: COMMERCIAL

## 2023-12-20 VITALS
BODY MASS INDEX: 36.51 KG/M2 | WEIGHT: 275.5 LBS | HEIGHT: 73 IN | HEART RATE: 61 BPM | RESPIRATION RATE: 16 BRPM | TEMPERATURE: 98.3 F | SYSTOLIC BLOOD PRESSURE: 140 MMHG | DIASTOLIC BLOOD PRESSURE: 76 MMHG | OXYGEN SATURATION: 97 %

## 2023-12-20 DIAGNOSIS — E11.8 TYPE 2 DIABETES MELLITUS WITH COMPLICATION, WITH LONG-TERM CURRENT USE OF INSULIN (H): ICD-10-CM

## 2023-12-20 DIAGNOSIS — E11.42 DIABETIC POLYNEUROPATHY ASSOCIATED WITH TYPE 2 DIABETES MELLITUS (H): Primary | ICD-10-CM

## 2023-12-20 DIAGNOSIS — Z29.11 NEED FOR VACCINATION AGAINST RESPIRATORY SYNCYTIAL VIRUS: ICD-10-CM

## 2023-12-20 DIAGNOSIS — Z79.4 TYPE 2 DIABETES MELLITUS WITH COMPLICATION, WITH LONG-TERM CURRENT USE OF INSULIN (H): ICD-10-CM

## 2023-12-20 DIAGNOSIS — I10 ESSENTIAL HYPERTENSION: ICD-10-CM

## 2023-12-20 LAB
HBA1C MFR BLD: 7.5 % (ref 0–5.6)
HOLD SPECIMEN: NORMAL
HOLD SPECIMEN: NORMAL

## 2023-12-20 PROCEDURE — G0009 ADMIN PNEUMOCOCCAL VACCINE: HCPCS | Performed by: FAMILY MEDICINE

## 2023-12-20 PROCEDURE — 83036 HEMOGLOBIN GLYCOSYLATED A1C: CPT | Performed by: FAMILY MEDICINE

## 2023-12-20 PROCEDURE — 36415 COLL VENOUS BLD VENIPUNCTURE: CPT | Performed by: FAMILY MEDICINE

## 2023-12-20 PROCEDURE — 90677 PCV20 VACCINE IM: CPT | Performed by: FAMILY MEDICINE

## 2023-12-20 PROCEDURE — 99214 OFFICE O/P EST MOD 30 MIN: CPT | Mod: 25 | Performed by: FAMILY MEDICINE

## 2023-12-20 RX ORDER — RESPIRATORY SYNCYTIAL VIRUS VACCINE 120MCG/0.5
0.5 KIT INTRAMUSCULAR ONCE
Qty: 1 EACH | Refills: 0 | Status: CANCELLED | OUTPATIENT
Start: 2023-12-20 | End: 2023-12-20

## 2023-12-20 RX ORDER — FLURBIPROFEN SODIUM 0.3 MG/ML
SOLUTION/ DROPS OPHTHALMIC
Qty: 450 EACH | Refills: 4 | Status: SHIPPED | OUTPATIENT
Start: 2023-12-20 | End: 2024-01-31

## 2023-12-20 NOTE — ASSESSMENT & PLAN NOTE
Type 2 diabetes with complication of retinopathy.  Planning to see the eye doctor this Thursday.  A1c is down from 8.0 in March 2020 23-7.5 today, 12/20/2023      He had been seeing endocrinologist but declines going back to the endocrinologist saying he does not want to see specialist.     He continues to take aspirin, Lipitor, is on valsartan for ARB protection of kidneys.     Unfortunately his blood pressure is elevated today at 140/76 and I would like to get this down a bit more however he is not interested in medication changes today.     I also suggested starting Mounjaro and trying to taper down on insulin and increase GLP-1 receptor agonist but he is not interested in this at this time but thinks maybe in the new year his insurance will make the change.      Currently he will continue Humalog insulin, Lantus insulin, Jardiance 10 mg p.o. daily, metformin 1000 mg twice daily.  He will consider switching to mounjaro in 2024, but not ready for that now.     And follow-up in 3 months.

## 2023-12-20 NOTE — PROGRESS NOTES
Assessment & Plan   Problem List Items Addressed This Visit          Nervous and Auditory    Diabetic polyneuropathy associated with type 2 diabetes mellitus (H) - Primary    Relevant Orders    Hemoglobin A1c (Completed)       Endocrine    Type 2 diabetes mellitus with complication, with long-term current use of insulin (H)     Type 2 diabetes with complication of retinopathy.  Planning to see the eye doctor this Thursday.  A1c is down from 8.0 in March 2020 23-7.5 today, 12/20/2023      He had been seeing endocrinologist but declines going back to the endocrinologist saying he does not want to see specialist.     He continues to take aspirin, Lipitor, is on valsartan for ARB protection of kidneys.     Unfortunately his blood pressure is elevated today at 140/76 and I would like to get this down a bit more however he is not interested in medication changes today.     I also suggested starting Mounjaro and trying to taper down on insulin and increase GLP-1 receptor agonist but he is not interested in this at this time but thinks maybe in the new year his insurance will make the change.      Currently he will continue Humalog insulin, Lantus insulin, Jardiance 10 mg p.o. daily, metformin 1000 mg twice daily.  He will consider switching to mounjaro in 2024, but not ready for that now.     And follow-up in 3 months.          Relevant Medications    B-D U/F insulin pen needle       Circulatory    Essential hypertension     Uncontrolled hypertension-blood pressure is 140/76 today.  Looking back it looks like his blood pressure has been uncontrolled since July.  We did discuss increasing his blood pressure medications however at this time he does not want to do that.  However he does have an appoint with nephrology in February and this is because of his microalbuminuria.  So I believe they will also address his blood pressure at that appointment.          Other Visit Diagnoses       Need for vaccination against  "respiratory syncytial virus                        Blood sugar testing frequency justification:        Regi Marroquin MD  [facility] CARLEY Mcgill is a 68 year old, presenting for the following health issues:  Follow Up        12/20/2023     7:52 AM   Additional Questions   Roomed by Yaya Browning MA   Accompanied by Self         12/20/2023     7:52 AM   Patient Reported Additional Medications   Patient reports taking the following new medications None       History of Present Illness       Diabetes:   He presents for follow up of diabetes.  He is checking home blood glucose two times daily.   He checks blood glucose before and after meals and at bedtime.  Blood glucose is never over 200 and sometimes under 70. He is aware of hypoglycemia symptoms including dizziness.    He has no concerns regarding his diabetes at this time.  He is having numbness in feet.            He eats 2-3 servings of fruits and vegetables daily.He consumes 0 sweetened beverage(s) daily.He exercises with enough effort to increase his heart rate 10 to 19 minutes per day.  He exercises with enough effort to increase his heart rate 4 days per week.   He is taking medications regularly.           Objective    BP (!) 140/76 (BP Location: Left arm, Patient Position: Sitting, Cuff Size: Adult Large)   Pulse 61   Temp 98.3  F (36.8  C) (Oral)   Resp 16   Ht 1.854 m (6' 1\")   Wt 125 kg (275 lb 8 oz)   SpO2 97%   BMI 36.35 kg/m    Body mass index is 36.35 kg/m .  Physical Exam  Constitutional:       Appearance: Normal appearance.   HENT:      Head: Normocephalic and atraumatic.   Cardiovascular:      Rate and Rhythm: Normal rate and regular rhythm.      Heart sounds: Normal heart sounds.   Pulmonary:      Effort: Pulmonary effort is normal.      Breath sounds: Normal breath sounds.   Musculoskeletal:         General: Normal range of motion.      Cervical back: Normal range of motion and neck supple.   Neurological: "      General: No focal deficit present.      Mental Status: He is alert.          Diabetic foot exam: normal DP and PT pulses, no trophic changes or ulcerative lesions, and normal sensory exam

## 2023-12-20 NOTE — ASSESSMENT & PLAN NOTE
>>ASSESSMENT AND PLAN FOR TYPE 2 DIABETES MELLITUS WITH COMPLICATION, WITH LONG-TERM CURRENT USE OF INSULIN (H) WRITTEN ON 12/20/2023  8:28 AM BY KANG BOBO MD    Type 2 diabetes with complication of retinopathy.  Planning to see the eye doctor this Thursday.  A1c is down from 8.0 in March 2020 23-7.5 today, 12/20/2023      He had been seeing endocrinologist but declines going back to the endocrinologist saying he does not want to see specialist.     He continues to take aspirin, Lipitor, is on valsartan for ARB protection of kidneys.     Unfortunately his blood pressure is elevated today at 140/76 and I would like to get this down a bit more however he is not interested in medication changes today.     I also suggested starting Mounjaro and trying to taper down on insulin and increase GLP-1 receptor agonist but he is not interested in this at this time but thinks maybe in the new year his insurance will make the change.      Currently he will continue Humalog insulin, Lantus insulin, Jardiance 10 mg p.o. daily, metformin 1000 mg twice daily.  He will consider switching to mounjaro in 2024, but not ready for that now.     And follow-up in 3 months.

## 2023-12-20 NOTE — ASSESSMENT & PLAN NOTE
Uncontrolled hypertension-blood pressure is 140/76 today.  Looking back it looks like his blood pressure has been uncontrolled since July.  We did discuss increasing his blood pressure medications however at this time he does not want to do that.  However he does have an appoint with nephrology in February and this is because of his microalbuminuria.  So I believe they will also address his blood pressure at that appointment.

## 2024-01-26 ENCOUNTER — TELEPHONE (OUTPATIENT)
Dept: FAMILY MEDICINE | Facility: CLINIC | Age: 69
End: 2024-01-26
Payer: COMMERCIAL

## 2024-01-26 NOTE — TELEPHONE ENCOUNTER
Patient Quality Outreach    Patient is due for the following:   Diabetes -  A1C and Diabetic Follow-Up Visit    Next Steps:   Schedule a office visit for 3/26/24    Type of outreach:    Phone, spoke to patient/parent. Patient    Next Steps:  Reach out within 90 days via Phone.    Max number of attempts reached: No. Will try again in 90 days if patient still on fail list.    Questions for provider review:    None           Yaya Browning MA  Chart routed to self.

## 2024-01-29 ENCOUNTER — LAB (OUTPATIENT)
Dept: LAB | Facility: CLINIC | Age: 69
End: 2024-01-29
Payer: COMMERCIAL

## 2024-01-29 DIAGNOSIS — I10 ESSENTIAL HYPERTENSION: ICD-10-CM

## 2024-01-29 LAB
ALBUMIN MFR UR ELPH: 57.2 MG/DL
ALBUMIN SERPL BCG-MCNC: 4.2 G/DL (ref 3.5–5.2)
ALBUMIN UR-MCNC: 100 MG/DL
ANION GAP SERPL CALCULATED.3IONS-SCNC: 11 MMOL/L (ref 7–15)
APPEARANCE UR: CLEAR
BILIRUB UR QL STRIP: NEGATIVE
BUN SERPL-MCNC: 21.4 MG/DL (ref 8–23)
CALCIUM SERPL-MCNC: 9.2 MG/DL (ref 8.8–10.2)
CHLORIDE SERPL-SCNC: 102 MMOL/L (ref 98–107)
COLOR UR AUTO: YELLOW
CREAT SERPL-MCNC: 1.14 MG/DL (ref 0.67–1.17)
CREAT UR-MCNC: 146 MG/DL
CREAT UR-MCNC: 148 MG/DL
DEPRECATED HCO3 PLAS-SCNC: 25 MMOL/L (ref 22–29)
EGFRCR SERPLBLD CKD-EPI 2021: 71 ML/MIN/1.73M2
ERYTHROCYTE [DISTWIDTH] IN BLOOD BY AUTOMATED COUNT: 14.4 % (ref 10–15)
GLUCOSE SERPL-MCNC: 128 MG/DL (ref 70–99)
GLUCOSE UR STRIP-MCNC: >=1000 MG/DL
HCT VFR BLD AUTO: 44 % (ref 40–53)
HGB BLD-MCNC: 14 G/DL (ref 13.3–17.7)
HGB UR QL STRIP: NEGATIVE
KETONES UR STRIP-MCNC: ABNORMAL MG/DL
LEUKOCYTE ESTERASE UR QL STRIP: NEGATIVE
MCH RBC QN AUTO: 26.3 PG (ref 26.5–33)
MCHC RBC AUTO-ENTMCNC: 31.8 G/DL (ref 31.5–36.5)
MCV RBC AUTO: 83 FL (ref 78–100)
MICROALBUMIN UR-MCNC: 293 MG/L
MICROALBUMIN/CREAT UR: 197.97 MG/G CR (ref 0–17)
NITRATE UR QL: NEGATIVE
PH UR STRIP: 5 [PH] (ref 5–8)
PHOSPHATE SERPL-MCNC: 4.1 MG/DL (ref 2.5–4.5)
PLATELET # BLD AUTO: 249 10E3/UL (ref 150–450)
POTASSIUM SERPL-SCNC: 4 MMOL/L (ref 3.4–5.3)
PROT/CREAT 24H UR: 0.39 MG/MG CR (ref 0–0.2)
PTH-INTACT SERPL-MCNC: 22 PG/ML (ref 15–65)
RBC # BLD AUTO: 5.32 10E6/UL (ref 4.4–5.9)
RBC #/AREA URNS AUTO: NORMAL /HPF
SODIUM SERPL-SCNC: 138 MMOL/L (ref 135–145)
SP GR UR STRIP: 1.02 (ref 1–1.03)
UROBILINOGEN UR STRIP-ACNC: 0.2 E.U./DL
VIT D+METAB SERPL-MCNC: 39 NG/ML (ref 20–50)
WBC # BLD AUTO: 7.2 10E3/UL (ref 4–11)
WBC #/AREA URNS AUTO: NORMAL /HPF

## 2024-01-29 PROCEDURE — 85027 COMPLETE CBC AUTOMATED: CPT

## 2024-01-29 PROCEDURE — 80069 RENAL FUNCTION PANEL: CPT

## 2024-01-29 PROCEDURE — 81001 URINALYSIS AUTO W/SCOPE: CPT

## 2024-01-29 PROCEDURE — 36415 COLL VENOUS BLD VENIPUNCTURE: CPT

## 2024-01-29 PROCEDURE — 82570 ASSAY OF URINE CREATININE: CPT

## 2024-01-29 PROCEDURE — 82043 UR ALBUMIN QUANTITATIVE: CPT

## 2024-01-29 PROCEDURE — 82306 VITAMIN D 25 HYDROXY: CPT

## 2024-01-29 PROCEDURE — 84156 ASSAY OF PROTEIN URINE: CPT

## 2024-01-29 PROCEDURE — 83970 ASSAY OF PARATHORMONE: CPT

## 2024-01-31 DIAGNOSIS — I10 ESSENTIAL HYPERTENSION: ICD-10-CM

## 2024-01-31 DIAGNOSIS — E11.8 TYPE 2 DIABETES MELLITUS WITH COMPLICATION, WITH LONG-TERM CURRENT USE OF INSULIN (H): ICD-10-CM

## 2024-01-31 DIAGNOSIS — E78.5 HYPERLIPIDEMIA WITH TARGET LDL LESS THAN 100: ICD-10-CM

## 2024-01-31 DIAGNOSIS — Z79.4 TYPE 2 DIABETES MELLITUS WITH COMPLICATION, WITH LONG-TERM CURRENT USE OF INSULIN (H): ICD-10-CM

## 2024-01-31 NOTE — TELEPHONE ENCOUNTER
Pt's insurance changed and new ins required a new mail order pharmacy. They are unable to transfer the RX from the old location and are requesting new ones for all medications.  Please advise.      Medication Request    Medication name:   1) valsartan-hydrochlorothiazide (DIOVAN HCT) 320-25 MG tablet   2) metoprolol succinate ER (TOPROL XL) 50 MG 24 hr tablet   3) metFORMIN (GLUCOPHAGE) 1000 MG tablet   4) lancets (ONETOUCH DELICA LANCETS) 33 gauge Misc   5) insulin lispro (HUMALOG KWIKPEN) 100 UNIT/ML (1 unit dial) KWIKPEN   6) insulin glargine (LANTUS SOLOSTAR) 100 UNIT/ML pen   7) empagliflozin (JARDIANCE) 10 MG TABS tablet   8) blood glucose (ONETOUCH ULTRA) test strip   9) B-D U/F insulin pen needle   10) atorvastatin (LIPITOR) 10 MG tablet   11) aspirin (ASA) 81 MG chewable tablet   12) amLODIPine (NORVASC) 10 MG tablet     Requested Pharmacy:  [facility]    When was patient last seen?:  12/20/23    Patient offered appointment:  Yes, 03/26/24    Okay to leave a detailed message: yes, Would like a call or MyChart to let him know once these have been sent.

## 2024-02-01 RX ORDER — METOPROLOL SUCCINATE 50 MG/1
50 TABLET, EXTENDED RELEASE ORAL DAILY
Qty: 90 TABLET | Refills: 0 | Status: SHIPPED | OUTPATIENT
Start: 2024-02-01 | End: 2024-04-16

## 2024-02-01 RX ORDER — FLURBIPROFEN SODIUM 0.3 MG/ML
SOLUTION/ DROPS OPHTHALMIC
Qty: 450 EACH | Refills: 4 | Status: SHIPPED | OUTPATIENT
Start: 2024-02-01

## 2024-02-01 RX ORDER — AMLODIPINE BESYLATE 10 MG/1
10 TABLET ORAL DAILY
Qty: 90 TABLET | Refills: 3 | Status: SHIPPED | OUTPATIENT
Start: 2024-02-01 | End: 2024-04-16

## 2024-02-01 RX ORDER — ASPIRIN 81 MG/1
81 TABLET, CHEWABLE ORAL DAILY
Qty: 90 TABLET | Refills: 4 | Status: SHIPPED | OUTPATIENT
Start: 2024-02-01

## 2024-02-01 RX ORDER — AMLODIPINE BESYLATE 10 MG/1
10 TABLET ORAL DAILY
Qty: 90 TABLET | Refills: 0 | Status: SHIPPED | OUTPATIENT
Start: 2024-02-01 | End: 2024-04-16

## 2024-02-01 RX ORDER — BLOOD SUGAR DIAGNOSTIC
STRIP MISCELLANEOUS
Qty: 100 STRIP | Refills: 11 | Status: SHIPPED | OUTPATIENT
Start: 2024-02-01

## 2024-02-01 RX ORDER — ATORVASTATIN CALCIUM 10 MG/1
10 TABLET, FILM COATED ORAL DAILY
Qty: 90 TABLET | Refills: 3 | Status: SHIPPED | OUTPATIENT
Start: 2024-02-01 | End: 2024-04-16

## 2024-02-01 RX ORDER — BLOOD-GLUCOSE METER
KIT MISCELLANEOUS
Qty: 100 EACH | Refills: 4 | Status: SHIPPED | OUTPATIENT
Start: 2024-02-01

## 2024-02-01 RX ORDER — VALSARTAN AND HYDROCHLOROTHIAZIDE 320; 25 MG/1; MG/1
1 TABLET, FILM COATED ORAL DAILY
Qty: 90 TABLET | Refills: 0 | Status: SHIPPED | OUTPATIENT
Start: 2024-02-01 | End: 2024-03-18

## 2024-02-01 RX ORDER — INSULIN LISPRO 100 [IU]/ML
INJECTION, SOLUTION INTRAVENOUS; SUBCUTANEOUS
Qty: 15 ML | Refills: 1 | Status: SHIPPED | OUTPATIENT
Start: 2024-02-01 | End: 2024-03-18

## 2024-02-01 RX ORDER — INSULIN GLARGINE 100 [IU]/ML
INJECTION, SOLUTION SUBCUTANEOUS
Qty: 90 ML | Refills: 0 | Status: SHIPPED | OUTPATIENT
Start: 2024-02-01 | End: 2024-03-18

## 2024-02-05 ENCOUNTER — VIRTUAL VISIT (OUTPATIENT)
Dept: NEPHROLOGY | Facility: CLINIC | Age: 69
End: 2024-02-05
Attending: FAMILY MEDICINE
Payer: COMMERCIAL

## 2024-02-05 DIAGNOSIS — I10 ESSENTIAL HYPERTENSION: ICD-10-CM

## 2024-02-05 DIAGNOSIS — R80.9 MICROALBUMINURIA: Primary | ICD-10-CM

## 2024-02-05 PROCEDURE — 99204 OFFICE O/P NEW MOD 45 MIN: CPT | Mod: 95 | Performed by: INTERNAL MEDICINE

## 2024-02-05 RX ORDER — CARVEDILOL 12.5 MG/1
12.5 TABLET ORAL 2 TIMES DAILY WITH MEALS
Qty: 180 TABLET | Refills: 3 | Status: SHIPPED | OUTPATIENT
Start: 2024-02-05 | End: 2024-04-16

## 2024-02-05 RX ORDER — PLANT STANOL ESTER 450 MG
TABLET ORAL
COMMUNITY
Start: 2024-02-05

## 2024-02-05 NOTE — PROGRESS NOTES
Artem is a 68 year old who is being evaluated via a billable video visit.      How would you like to obtain your AVS? MyChart  If the video visit is dropped, the invitation should be resent by: Text to cell phone: [phone]  Will anyone else be joining your video visit? No          Video-Visit Details    Type of service:  Video Visit   Video Start Time:  301pm  Video End Time:3:34 PM    Originating Location (pt. Location): Home    Distant Location (provider location):  On-site  Platform used for Video Visit: Lucie  Signed Electronically by: Jacki Blackman MD      Assessment and Plan:  68 year old male with history of diabetes since early 2000s, hypertension since young adulthood, on treatment since early 2000s, who presents for evaluation of proteinuria.    # CKD stage 2 with albuminuria of 300mg/gcr: discussed his risk for progression, and lowering proteinuria  - will increase jardiance to 25mg and switch metoprolol to carvedilol for better BP control.  - discussed minimizing NSAIDs, hydration, sick day protocol.  - return in 6 months  for followup     # Hypertension: blood pressure is high than goal. His goal is toward 120/80 as tolerated, thus will switch metoprolol to carvedilol 12.5mg, can increase to 25mg if needed .  Can check renin/ arely to assess this  - consider secondary workup given he has been hypertensive since young adult, but ? White coat hypertension component.  - US kidney ordered.     # Not anemic  # Electrolytes:   - Potassium; level: Normal  - Bicarbonate; level: Normal    # Mineral Bone Disorder:   - Calcium; level is:  Normal     Assessment and plan was discussed with patient and he voiced his understanding and agreement.    Consult:  Artem Pryor was seen in consultation at the request of Dr. Marroquin for proteinuria.    Reason for Visit:  Artem Pryor is a 68 year old male with diabetes and hypertension, who presents for evaluation.    HPI:  He is a pleasant male with history  of diabetes since early 2000s, hypertension, obesity who is referred for proteinuria and hypertension   He has neuropathy   He states he has had hypertension 'all his life' and started medications in the early 2000s when his diabetes    His systolic today is 146/78 and pulse 61  His father had HTN and his brother had kidney failure from type I diabetes.  He occasionally uses ibuprofen but he makes sure he is hydrating  He is on a good regimen for diabetes and hypertension including jardiance.  He is now retired and exercises occasionally but is fairly active in the yard / around the house.  He denies kidney stone history and take potassium gluconate (not citrate).    Renal History:   Kidney Disease and Medical Hx:  h/o HTN: Yes      ROS:   A comprehensive review of systems was obtained and negative, except as noted in the HPI or PMH.    Active Medical Problems:  Patient Active Problem List   Diagnosis    Type 2 diabetes mellitus with complication, with long-term current use of insulin (H)    Essential hypertension    Hyperlipidemia with target LDL less than 100    Microalbuminuria    Vitamin D deficiency    Diabetic polyneuropathy associated with type 2 diabetes mellitus (H)    Monocytosis    Mild nonproliferative diabetic retinopathy (H)    Class 2 severe obesity with serious comorbidity and body mass index (BMI) of 36.0 to 36.9 in adult, unspecified obesity type (H)    Other hydrocele    Encounter for preventive care    Impotence     PMH:   Medical record was reviewed and PMH was discussed with patient and noted below.  Past Medical History:   Diagnosis Date    Anemia of chronic disease 6/3/2019    Heme 11/2018     Diabetes mellitus (H)     Hypertension     Lyme disease      PSH:   No past surgical history on file.    Family Hx:   Family History   Problem Relation Age of Onset    Breast Cancer Mother 70.00        Partial Masectomy    Cerebrovascular Disease Mother     Hypertension Mother     Osteoporosis Mother   "   Prostate Cancer Father 70.00    Melanoma Father 70    Heart Failure Father     Hypertension Father     Breast Cancer Father         Celiaonoma    Colon Cancer Maternal Grandfather 70.00    Colon Cancer Maternal Uncle 85    Thyroid Cancer Maternal Cousin 25    Cancer Brother         uncertain type    Diabetes Brother             Other Cancer Brother         Facial Cancer    Cerebrovascular Disease Paternal Grandfather             Diabetes Paternal Grandmother          since      Personal Hx:   Social History     Tobacco Use    Smoking status: Former     Types: Cigarettes     Quit date: 1978     Years since quittin.1    Smokeless tobacco: Never   Substance Use Topics    Alcohol use: Yes     Comment: Very Rarely.  3 or 4 cans of beer a year       Allergies:  Allergies   Allergen Reactions    Lisinopril Cough       Medications:  Current Outpatient Medications   Medication Sig    amLODIPine (NORVASC) 10 MG tablet Take 1 tablet (10 mg) by mouth daily    amLODIPine (NORVASC) 10 MG tablet TAKE 1 TABLET DAILY    ASCORBIC ACID, VITAMIN C, ORAL [ASCORBIC ACID, VITAMIN C, ORAL] Take 1 tablet by mouth daily.          aspirin (ASA) 81 MG chewable tablet Take 1 tablet (81 mg) by mouth daily    atorvastatin (LIPITOR) 10 MG tablet Take 1 tablet (10 mg) by mouth daily    B-D U/F insulin pen needle [BD ULTRA-FINE MINI PEN NEEDLE 31 GAUGE X 3/16\" NDLE] use 4 to 5 times daily with insulin    B-D ULTRA-FINE 33 LANCETS MISC Use as directed    blood glucose (ACCU-CHEK CARI PLUS) test strip [BLOOD GLUCOSE TEST (ONETOUCH ULTRA BLUE TEST STRIP) STRIPS] Test 3 times a day, dx E11.65, on insulin, 90 day supply    blood glucose (ONETOUCH ULTRA) test strip [BLOOD GLUCOSE TEST (ONETOUCH ULTRA BLUE TEST STRIP) STRIPS] Test 3 times a day, dx E11.65, on insulin, 90 day supply    carica papaya (PAPAYA ENZYME) Tab [CARICA PAPAYA (PAPAYA ENZYME) TAB] Take 1 tablet by mouth daily.          cholecalciferol, vitamin " D3, 5,000 unit Tab [CHOLECALCIFEROL, VITAMIN D3, 5,000 UNIT TAB] Take 5,000 Units by mouth daily.    CYANOCOBALAMIN, VITAMIN B-12, ORAL [CYANOCOBALAMIN, VITAMIN B-12, ORAL] Take 1 tablet by mouth daily.          empagliflozin (JARDIANCE) 10 MG TABS tablet Take 1 tablet (10 mg) by mouth daily    insulin glargine (LANTUS SOLOSTAR) 100 UNIT/ML pen INJECT 30 UNITS UNDER THE SKIN IN THE MORNING AND 60 UNITS AT BEDTIME    insulin lispro (HUMALOG KWIKPEN) 100 UNIT/ML (1 unit dial) KWIKPEN INJECT 1 UNITS PER 3 GRAMS OF CARBOHYDRATE PLUS CORRECTION WITH EACH MEAL FOR TYPE 2 WITH HYPERGLYCEMIA. Maximum of 50 units per day.    metFORMIN (GLUCOPHAGE) 1000 MG tablet Take 1 tablet (1,000 mg) by mouth 2 times daily (with meals)    metoprolol succinate ER (TOPROL XL) 50 MG 24 hr tablet Take 1 tablet (50 mg) by mouth daily    multivitamin-Ca-iron-minerals Tab [MULTIVITAMIN-CA-IRON-MINERALS TAB] Take 1 tablet by mouth daily.          POTASSIUM CITRATE ORAL [POTASSIUM CITRATE ORAL] Take 2 tablets by mouth daily.          valsartan-hydrochlorothiazide (DIOVAN HCT) 320-25 MG tablet Take 1 tablet by mouth daily     No current facility-administered medications for this visit.      Vitals:  There were no vitals taken for this visit.    Exam:  GENERAL APPEARANCE: alert and no distress  HENT: mouth without ulcers or lesions  RESP: lungs clear to auscultation - no rales, rhonchi or wheezes  CV: regular rhythm, normal rate, no rub, no murmur  EDEMA: no LE edema bilaterally  ABDOMEN: soft, nondistended, nontender, bowel sounds normal  MS: extremities normal - no gross deformities noted, no evidence of inflammation in joints, no muscle tenderness  SKIN: no rash    LABS:   CMP  Recent Labs   Lab Test 01/29/24  0739 09/19/23  1025 08/24/22  1614 03/01/22  0922 12/18/20  0753 07/10/20  0755 12/27/19  0736 11/15/19  0813    142 140 141 143 140 140 141   POTASSIUM 4.0 4.4 4.1 4.0 4.3 4.1 4.4 4.5   CHLORIDE 102 101 102 102 104 102 99 103   CO2  25 26 24 21* 18* 28 29 26   ANIONGAP 11 15 14 18 21* 10 12 12   * 117* 71 142* 144* 130* 180* 151*   BUN 21.4 15.6 21.4 16 17 16 15 17   CR 1.14 1.02 1.03 0.95 0.98 1.05 1.09 1.02   GFRESTIMATED 71 81 81 89 >60 >60 >60 >60   GFRESTBLACK  --   --   --   --  >60 >60 >60 >60   KARRI 9.2 9.7 9.6 9.5 9.5 9.5 10.5 9.4     Recent Labs   Lab Test 09/19/23  1025 08/24/22  1614 03/01/22  0922 12/18/20  0753   BILITOTAL 0.4 0.4  --  0.3   ALKPHOS 89 101  --  101   ALT 42 33 47* 49*   AST 28 23 31 30     CBC  Recent Labs   Lab Test 01/29/24  0739 12/07/22  0850 08/24/22  1614   HGB 14.0 14.4 13.4   WBC 7.2 8.0 8.6   RBC 5.32 5.63 5.07   HCT 44.0 45.9 41.3   MCV 83 82 82   MCH 26.3* 25.6* 26.4*   MCHC 31.8 31.4* 32.4   RDW 14.4 14.2 14.3    291 284     URINE STUDIES  Recent Labs   Lab Test 01/29/24  0749   COLOR Yellow   APPEARANCE Clear   URINEGLC >=1000*   URINEBILI Negative   URINEKETONE Trace*   SG 1.025   UBLD Negative   URINEPH 5.0   PROTEIN 100*   UROBILINOGEN 0.2   NITRITE Negative   LEUKEST Negative   RBCU None Seen   WBCU 0-5     No lab results found.  PTH  Recent Labs   Lab Test 01/29/24  0739   PTHI 22     IRON STUDIES  No lab results found.      Jacki Blackman MD

## 2024-02-05 NOTE — PATIENT INSTRUCTIONS
Stop metoprolol  Start carvedilol 12.5 mg twice a day  Increase jardiance to 25mg (ok to take two 10mg to use up what you have)    Repeat labs and return 6 months

## 2024-02-06 ENCOUNTER — TELEPHONE (OUTPATIENT)
Dept: NEPHROLOGY | Facility: CLINIC | Age: 69
End: 2024-02-06
Payer: COMMERCIAL

## 2024-02-06 DIAGNOSIS — R80.9 MICROALBUMINURIA: Primary | ICD-10-CM

## 2024-02-06 NOTE — TELEPHONE ENCOUNTER
Left vm for patient to call [phone] to schedule a follow up visit with Dr Blackman in Aug per Dr Blackman after visit note on 2/5/24.  NITZA Escalona Care Coordinator  Nephrology

## 2024-02-07 NOTE — TELEPHONE ENCOUNTER
[facility] Call Center    Phone Message    May a detailed message be left on voicemail: yes     Reason for Call: Order(s): Lab Orders  Reason for requested: Patient has lab appt on 08/05  Date needed: August  Provider name: Jacki Blackman    Action Taken: Other: Nephrology    Travel Screening: Not Applicable

## 2024-02-09 ENCOUNTER — HOSPITAL ENCOUNTER (OUTPATIENT)
Dept: ULTRASOUND IMAGING | Facility: HOSPITAL | Age: 69
Discharge: HOME | End: 2024-02-09
Attending: INTERNAL MEDICINE | Admitting: INTERNAL MEDICINE
Payer: COMMERCIAL

## 2024-02-09 DIAGNOSIS — I10 ESSENTIAL HYPERTENSION: ICD-10-CM

## 2024-02-09 DIAGNOSIS — R80.9 MICROALBUMINURIA: ICD-10-CM

## 2024-02-09 PROCEDURE — 76770 US EXAM ABDO BACK WALL COMP: CPT

## 2024-02-25 ENCOUNTER — HEALTH MAINTENANCE LETTER (OUTPATIENT)
Age: 69
End: 2024-02-25

## 2024-03-13 DIAGNOSIS — Z79.4 TYPE 2 DIABETES MELLITUS WITH COMPLICATION, WITH LONG-TERM CURRENT USE OF INSULIN (H): ICD-10-CM

## 2024-03-13 DIAGNOSIS — E11.8 TYPE 2 DIABETES MELLITUS WITH COMPLICATION, WITH LONG-TERM CURRENT USE OF INSULIN (H): ICD-10-CM

## 2024-03-13 RX ORDER — INSULIN LISPRO 100 [IU]/ML
INJECTION, SOLUTION INTRAVENOUS; SUBCUTANEOUS
Qty: 30 ML | Refills: 5 | OUTPATIENT
Start: 2024-03-13

## 2024-03-18 ENCOUNTER — MYC REFILL (OUTPATIENT)
Dept: FAMILY MEDICINE | Facility: CLINIC | Age: 69
End: 2024-03-18
Payer: COMMERCIAL

## 2024-03-18 ENCOUNTER — MYC REFILL (OUTPATIENT)
Dept: NEPHROLOGY | Facility: CLINIC | Age: 69
End: 2024-03-18
Payer: COMMERCIAL

## 2024-03-18 ENCOUNTER — TRANSFERRED RECORDS (OUTPATIENT)
Dept: HEALTH INFORMATION MANAGEMENT | Facility: CLINIC | Age: 69
End: 2024-03-18

## 2024-03-18 DIAGNOSIS — I10 ESSENTIAL HYPERTENSION: ICD-10-CM

## 2024-03-18 DIAGNOSIS — Z79.4 TYPE 2 DIABETES MELLITUS WITH COMPLICATION, WITH LONG-TERM CURRENT USE OF INSULIN (H): ICD-10-CM

## 2024-03-18 DIAGNOSIS — R80.9 MICROALBUMINURIA: ICD-10-CM

## 2024-03-18 DIAGNOSIS — E11.8 TYPE 2 DIABETES MELLITUS WITH COMPLICATION, WITH LONG-TERM CURRENT USE OF INSULIN (H): ICD-10-CM

## 2024-03-18 LAB — RETINOPATHY: POSITIVE

## 2024-03-18 RX ORDER — INSULIN GLARGINE 100 [IU]/ML
INJECTION, SOLUTION SUBCUTANEOUS
Qty: 90 ML | Refills: 0 | Status: SHIPPED | OUTPATIENT
Start: 2024-03-18 | End: 2024-06-24

## 2024-03-18 RX ORDER — VALSARTAN AND HYDROCHLOROTHIAZIDE 320; 25 MG/1; MG/1
1 TABLET, FILM COATED ORAL DAILY
Qty: 90 TABLET | Refills: 0 | Status: SHIPPED | OUTPATIENT
Start: 2024-03-18 | End: 2024-04-16

## 2024-03-18 RX ORDER — INSULIN LISPRO 100 [IU]/ML
INJECTION, SOLUTION INTRAVENOUS; SUBCUTANEOUS
Qty: 15 ML | Refills: 0 | Status: SHIPPED | OUTPATIENT
Start: 2024-03-18

## 2024-04-16 ENCOUNTER — OFFICE VISIT (OUTPATIENT)
Dept: FAMILY MEDICINE | Facility: CLINIC | Age: 69
End: 2024-04-16
Payer: COMMERCIAL

## 2024-04-16 VITALS
DIASTOLIC BLOOD PRESSURE: 78 MMHG | TEMPERATURE: 98.4 F | HEART RATE: 66 BPM | SYSTOLIC BLOOD PRESSURE: 152 MMHG | HEIGHT: 73 IN | WEIGHT: 279 LBS | RESPIRATION RATE: 16 BRPM | BODY MASS INDEX: 36.98 KG/M2 | OXYGEN SATURATION: 97 %

## 2024-04-16 DIAGNOSIS — E66.01 CLASS 2 SEVERE OBESITY WITH SERIOUS COMORBIDITY AND BODY MASS INDEX (BMI) OF 36.0 TO 36.9 IN ADULT, UNSPECIFIED OBESITY TYPE (H): ICD-10-CM

## 2024-04-16 DIAGNOSIS — E66.812 CLASS 2 SEVERE OBESITY WITH SERIOUS COMORBIDITY AND BODY MASS INDEX (BMI) OF 36.0 TO 36.9 IN ADULT, UNSPECIFIED OBESITY TYPE (H): ICD-10-CM

## 2024-04-16 DIAGNOSIS — I10 ESSENTIAL HYPERTENSION: ICD-10-CM

## 2024-04-16 DIAGNOSIS — Z00.00 HEALTH CARE MAINTENANCE: ICD-10-CM

## 2024-04-16 DIAGNOSIS — R80.9 MICROALBUMINURIA: ICD-10-CM

## 2024-04-16 DIAGNOSIS — F43.21 GRIEF: ICD-10-CM

## 2024-04-16 DIAGNOSIS — Z79.4 TYPE 2 DIABETES MELLITUS WITH COMPLICATION, WITH LONG-TERM CURRENT USE OF INSULIN (H): Primary | ICD-10-CM

## 2024-04-16 DIAGNOSIS — E78.5 HYPERLIPIDEMIA WITH TARGET LDL LESS THAN 100: ICD-10-CM

## 2024-04-16 DIAGNOSIS — E11.42 DIABETIC POLYNEUROPATHY ASSOCIATED WITH TYPE 2 DIABETES MELLITUS (H): ICD-10-CM

## 2024-04-16 DIAGNOSIS — E11.8 TYPE 2 DIABETES MELLITUS WITH COMPLICATION, WITH LONG-TERM CURRENT USE OF INSULIN (H): Primary | ICD-10-CM

## 2024-04-16 DIAGNOSIS — E11.3299 MILD NONPROLIFERATIVE DIABETIC RETINOPATHY ASSOCIATED WITH TYPE 2 DIABETES MELLITUS, MACULAR EDEMA PRESENCE UNSPECIFIED, UNSPECIFIED LATERALITY (H): ICD-10-CM

## 2024-04-16 LAB
HBA1C MFR BLD: 7.8 % (ref 0–5.6)
HOLD SPECIMEN: NORMAL
HOLD SPECIMEN: NORMAL

## 2024-04-16 PROCEDURE — 99214 OFFICE O/P EST MOD 30 MIN: CPT | Performed by: FAMILY MEDICINE

## 2024-04-16 PROCEDURE — 80048 BASIC METABOLIC PNL TOTAL CA: CPT | Performed by: FAMILY MEDICINE

## 2024-04-16 PROCEDURE — 36415 COLL VENOUS BLD VENIPUNCTURE: CPT | Performed by: FAMILY MEDICINE

## 2024-04-16 PROCEDURE — 83036 HEMOGLOBIN GLYCOSYLATED A1C: CPT | Performed by: FAMILY MEDICINE

## 2024-04-16 RX ORDER — ATORVASTATIN CALCIUM 10 MG/1
10 TABLET, FILM COATED ORAL DAILY
Qty: 90 TABLET | Refills: 3 | Status: SHIPPED | OUTPATIENT
Start: 2024-04-16

## 2024-04-16 RX ORDER — AMLODIPINE BESYLATE 10 MG/1
10 TABLET ORAL DAILY
Qty: 90 TABLET | Refills: 0 | Status: SHIPPED | OUTPATIENT
Start: 2024-04-16 | End: 2024-06-18

## 2024-04-16 RX ORDER — VALSARTAN AND HYDROCHLOROTHIAZIDE 320; 25 MG/1; MG/1
1 TABLET, FILM COATED ORAL DAILY
Qty: 90 TABLET | Refills: 0 | Status: SHIPPED | OUTPATIENT
Start: 2024-04-16 | End: 2024-08-21

## 2024-04-16 RX ORDER — CARVEDILOL 12.5 MG/1
25 TABLET ORAL 2 TIMES DAILY WITH MEALS
Qty: 180 TABLET | Refills: 3 | Status: SHIPPED | OUTPATIENT
Start: 2024-04-16

## 2024-04-16 NOTE — ASSESSMENT & PLAN NOTE
Grief. He says his brother  2024. But this is on the heels of the death of his mom and dad in the past two years. He declined grief counseling. He says he is fine.     He is also dealing with parents estate and his brothers estate.

## 2024-04-16 NOTE — ASSESSMENT & PLAN NOTE
>>ASSESSMENT AND PLAN FOR TYPE 2 DIABETES MELLITUS WITH COMPLICATION, WITH LONG-TERM CURRENT USE OF INSULIN (H) WRITTEN ON 12/18/2020  8:02 AM BY FREEDOM LEYVA    Fairly good control with an A1C of 7.8. We discussed an increase in insulin but the patient would prefer to work on adding in some exercise. Continue current dosing of medications. Recheck in 3 months.

## 2024-04-16 NOTE — ASSESSMENT & PLAN NOTE
>>ASSESSMENT AND PLAN FOR TYPE 2 DIABETES MELLITUS WITH COMPLICATION, WITH LONG-TERM CURRENT USE OF INSULIN (H) WRITTEN ON 11/15/2019  8:05 AM BY FREEDOM LEYVA    The patient started on jardiance a couple of weeks ago and that is going well. Blood sugars are improving on the new medication. He hashad intermittent low blood sugars before his evening meal so he is elliminating lunch time insulin if BS is not high and he is having limited carbs. No other change in his medication at this time. BMP today. Follow up with A1C and office visit in about 6 weeks.

## 2024-04-16 NOTE — ASSESSMENT & PLAN NOTE
Unfortunately his blood pressure is elevated today at 152/78 - we reviewed nephro note together, will increase coreg from 12.5 to 25 twice daily as recommended by nephro.     Current plan coreg 25mg po bid,   Norvasc 10mg daily   Diovan 320-25mg po q day  Plans to see nephrology in August 2024.     Follow up in 3 month to monitor bmp and bp.

## 2024-04-16 NOTE — ASSESSMENT & PLAN NOTE
>>ASSESSMENT AND PLAN FOR TYPE 2 DIABETES MELLITUS WITH COMPLICATION, WITH LONG-TERM CURRENT USE OF INSULIN (H) WRITTEN ON 9/27/2019  8:20 AM BY FREEDOM LEYVA    Not under adequate control.  Increase Lantus to 30 units in the morning and 50 units at bedtime.  Continue mealtime insulin with 1 unit per 3 g of carbohydrates plus correction for blood sugar at each meal.  Continue metformin.  I am recommending that the patient see the [facility] pharmacist to discuss addition of other medications.  Referral placed.  He was also asked to contact me in 2 weeks via my chart with his blood sugar so we can adjust his insulin.

## 2024-04-16 NOTE — ASSESSMENT & PLAN NOTE
Type 2 diabetes worsening with complication of retinopathy polyneuropathy.  A1c is 7.8 (up from 7.5)     He continues to take aspirin, Lipitor, is on valsartan for ARB protection of kidneys.     Unfortunately his blood pressure is elevated today at 152/78 - we reviewed nephro note together, will increase coreg from 12.5 to 25 twice daily as recommended by nephro.    His nephrologist also increased jardiance from 10 -25mg daily     I also suggested starting Mounjaro and trying to taper down on insulin and increase GLP-1 receptor agonist but he is not interested in this wanting to check with insurance.      Currently he will continue Humalog insulin, Lantus insulin, Jardiance 25 mg p.o. daily, metformin 1000 mg twice daily.  He will consider switching to mounjaro but declined today wanting to check with insurance.      And follow-up in 3 months.

## 2024-04-16 NOTE — ASSESSMENT & PLAN NOTE
>>ASSESSMENT AND PLAN FOR TYPE 2 DIABETES MELLITUS WITH COMPLICATION, WITH LONG-TERM CURRENT USE OF INSULIN (H) WRITTEN ON 12/27/2019  8:14 AM BY FREEDOM LEYVA    A1C still elevated at 8.2 but improved from 9.6 at previous visit. Since it is primarily his fasting sugars that are high, we will increase his Lantus to 60 units at bedtime and 30 units in the morning. Continue other medications unchanged: Metformin 1000 two times a day, empagliflozin 10 mg daily, and sliding scale Lispro 1 unit per carb plus correction. Recheck in 3 months.

## 2024-04-16 NOTE — ASSESSMENT & PLAN NOTE
>>ASSESSMENT AND PLAN FOR TYPE 2 DIABETES MELLITUS WITH COMPLICATION, WITH LONG-TERM CURRENT USE OF INSULIN (H) WRITTEN ON 8/16/2019  9:35 AM BY FREEDOM LEYVA    Most recent A1c was 9.4 in June 2019.  The patient unfortunately does not have his sugar log with him today but reports checking 4 times a day.  He also reports excellent compliance with his medications.  Increase Lantus to 30 units each morning and 40 units at bedtime.  Continue current dosing of lispro.  I have asked him to bring his log to his next appointment in 1 month.  We will plan to check an A1c at that time as well.

## 2024-04-16 NOTE — PROGRESS NOTES
Problem List Items Addressed This Visit          Nervous and Auditory    Diabetic polyneuropathy associated with type 2 diabetes mellitus (H)     Type 2 diabetes worsening with complication of retinopathy polyneuropathy.  A1c is 7.8 (up from 7.5)     He continues to take aspirin, Lipitor, is on valsartan for ARB protection of kidneys.     Unfortunately his blood pressure is elevated today at 152/78 - we reviewed nephro note together, will increase coreg from 12.5 to 25 twice daily as recommended by nephro.    His nephrologist also increased jardiance from 10 -25mg daily     I also suggested starting Mounjaro and trying to taper down on insulin and increase GLP-1 receptor agonist but he is not interested in this wanting to check with insurance.      Currently he will continue Humalog insulin, Lantus insulin, Jardiance 25 mg p.o. daily, metformin 1000 mg twice daily.  He will consider switching to mounjaro but declined today wanting to check with insurance.      And follow-up in 3 months.         Relevant Medications    empagliflozin (JARDIANCE) 25 MG TABS tablet    metFORMIN (GLUCOPHAGE) 1000 MG tablet       Digestive    Class 2 severe obesity with serious comorbidity and body mass index (BMI) of 36.0 to 36.9 in adult, unspecified obesity type (H)     Class II obesity with chronic comorbid weight related conditions including diabetes, hypertension, diabetic retinopathy.       Recommended Mounjaro which would help with both diabetes and weight loss and potentially improve multiple medical issues he has.  He is going to check with his insurance formulary to see if this is affordable and would consider depending on cost.         Relevant Medications    empagliflozin (JARDIANCE) 25 MG TABS tablet    metFORMIN (GLUCOPHAGE) 1000 MG tablet       Endocrine    Hyperlipidemia with target LDL less than 100     Hyperlipidemia - refilled lipitor 10 mg po q day         Relevant Medications    atorvastatin (LIPITOR) 10 MG tablet     Mild nonproliferative diabetic retinopathy (H)     Retinopathy-planning to see ophthalmologist on Thursday.  We discussed starting Mounjaro today to get better control of his diabetes but he is not interested in making this change at this time but will consider it for the future.            Relevant Medications    empagliflozin (JARDIANCE) 25 MG TABS tablet    metFORMIN (GLUCOPHAGE) 1000 MG tablet       Circulatory    Essential hypertension     Unfortunately his blood pressure is elevated today at 152/78 - we reviewed nephro note together, will increase coreg from 12.5 to 25 twice daily as recommended by nephro.     Current plan coreg 25mg po bid,   Norvasc 10mg daily   Diovan 320-25mg po q day  Plans to see nephrology in 2024.     Follow up in 3 month to monitor bmp and bp.          Relevant Medications    amLODIPine (NORVASC) 10 MG tablet    carvedilol (COREG) 12.5 MG tablet    empagliflozin (JARDIANCE) 25 MG TABS tablet    valsartan-hydrochlorothiazide (DIOVAN HCT) 320-25 MG tablet    Other Relevant Orders    Basic metabolic panel  (Ca, Cl, CO2, Creat, Gluc, K, Na, BUN)       Other    Microalbuminuria     Managed by nephro (increased jardiance in 2024)         Relevant Medications    carvedilol (COREG) 12.5 MG tablet    empagliflozin (JARDIANCE) 25 MG TABS tablet    Health care maintenance     Over Due for annual wellness visit he is asked to schedule.         Grief     Grief. He says his brother  2024. But this is on the heels of the death of his mom and dad in the past two years. He declined grief counseling. He says he is fine.     He is also dealing with parents estate and his brothers estate.           Other Visit Diagnoses       Type 2 diabetes mellitus with complication, with long-term current use of insulin (H)    -  Primary    Relevant Medications    empagliflozin (JARDIANCE) 25 MG TABS tablet    metFORMIN (GLUCOPHAGE) 1000 MG tablet    Other Relevant Orders    Hemoglobin A1c  "(Completed)             Parul Mcgill is a 68 year old, presenting for the following health issues:  Diabetes      4/16/2024     9:01 AM   Additional Questions   Roomed by as   Accompanied by self         4/16/2024     9:01 AM   Patient Reported Additional Medications   Patient reports taking the following new medications no     History of Present Illness       Diabetes:   He presents for follow up of diabetes.  He is checking home blood glucose three times daily.   He checks blood glucose before meals, after meals and at bedtime.  Blood glucose is sometimes over 200 and sometimes under 70. He is aware of hypoglycemia symptoms including shakiness and dizziness.    He has no concerns regarding his diabetes at this time.  He is having numbness in feet.            He eats 2-3 servings of fruits and vegetables daily.He consumes 0 sweetened beverage(s) daily.He exercises with enough effort to increase his heart rate 10 to 19 minutes per day.  He exercises with enough effort to increase his heart rate 4 days per week.   He is taking medications regularly.           Objective    BP (!) 152/78 (BP Location: Left arm, Patient Position: Left side, Cuff Size: Adult Large)   Pulse 66   Temp 98.4  F (36.9  C) (Oral)   Resp 16   Ht 1.854 m (6' 1\")   Wt 126.6 kg (279 lb)   SpO2 97%   BMI 36.81 kg/m    Body mass index is 36.81 kg/m .  Physical Exam  Constitutional:       Appearance: Normal appearance.   HENT:      Head: Normocephalic and atraumatic.   Cardiovascular:      Rate and Rhythm: Normal rate and regular rhythm.   Pulmonary:      Effort: Pulmonary effort is normal.   Musculoskeletal:         General: Normal range of motion.      Cervical back: Normal range of motion and neck supple.   Neurological:      General: No focal deficit present.      Mental Status: He is alert.             Signed Electronically by: Regi Marroquin MD    "

## 2024-04-17 LAB
ANION GAP SERPL CALCULATED.3IONS-SCNC: 13 MMOL/L (ref 7–15)
BUN SERPL-MCNC: 17.3 MG/DL (ref 8–23)
CALCIUM SERPL-MCNC: 9.4 MG/DL (ref 8.8–10.2)
CHLORIDE SERPL-SCNC: 101 MMOL/L (ref 98–107)
CREAT SERPL-MCNC: 1.03 MG/DL (ref 0.67–1.17)
DEPRECATED HCO3 PLAS-SCNC: 26 MMOL/L (ref 22–29)
EGFRCR SERPLBLD CKD-EPI 2021: 80 ML/MIN/1.73M2
GLUCOSE SERPL-MCNC: 117 MG/DL (ref 70–99)
POTASSIUM SERPL-SCNC: 3.9 MMOL/L (ref 3.4–5.3)
SODIUM SERPL-SCNC: 140 MMOL/L (ref 135–145)

## 2024-05-09 ENCOUNTER — TELEPHONE (OUTPATIENT)
Dept: FAMILY MEDICINE | Facility: CLINIC | Age: 69
End: 2024-05-09
Payer: COMMERCIAL

## 2024-05-09 NOTE — TELEPHONE ENCOUNTER
Patient Quality Outreach    Patient is due for the following:   Hypertension -  BP check and Hypertension follow-up visit    Next Steps:   Patient was scheduled for 7/24/24    Type of outreach:    Phone, spoke to patient/parent. Patient    Next Steps:  Reach out within 90 days via Phone.    Max number of attempts reached: No. Will try again in 90 days if patient still on fail list.    Questions for provider review:    None           Yaya Browning MA  Chart routed to self.

## 2024-06-04 ENCOUNTER — PATIENT OUTREACH (OUTPATIENT)
Dept: CARE COORDINATION | Facility: CLINIC | Age: 69
End: 2024-06-04
Payer: COMMERCIAL

## 2024-06-17 DIAGNOSIS — I10 ESSENTIAL HYPERTENSION: ICD-10-CM

## 2024-06-18 RX ORDER — AMLODIPINE BESYLATE 10 MG/1
10 TABLET ORAL DAILY
Qty: 90 TABLET | Refills: 3 | Status: SHIPPED | OUTPATIENT
Start: 2024-06-18

## 2024-06-23 DIAGNOSIS — Z79.4 TYPE 2 DIABETES MELLITUS WITH COMPLICATION, WITH LONG-TERM CURRENT USE OF INSULIN (H): ICD-10-CM

## 2024-06-23 DIAGNOSIS — E11.8 TYPE 2 DIABETES MELLITUS WITH COMPLICATION, WITH LONG-TERM CURRENT USE OF INSULIN (H): ICD-10-CM

## 2024-06-24 RX ORDER — INSULIN GLARGINE 100 [IU]/ML
INJECTION, SOLUTION SUBCUTANEOUS
Qty: 90 ML | Refills: 0 | Status: SHIPPED | OUTPATIENT
Start: 2024-06-24

## 2024-07-25 DIAGNOSIS — R80.9 MICROALBUMINURIA: ICD-10-CM

## 2024-07-25 DIAGNOSIS — I10 ESSENTIAL HYPERTENSION: ICD-10-CM

## 2024-07-30 RX ORDER — CARVEDILOL 12.5 MG/1
25 TABLET ORAL 2 TIMES DAILY WITH MEALS
Qty: 400 TABLET | Refills: 2 | OUTPATIENT
Start: 2024-07-30

## 2024-07-30 NOTE — TELEPHONE ENCOUNTER
Spoke with patient. He has established care elsewhere and refill request was sent error.    Please remove pended order and close encounter.

## 2024-08-19 DIAGNOSIS — Z79.4 TYPE 2 DIABETES MELLITUS WITH COMPLICATION, WITH LONG-TERM CURRENT USE OF INSULIN (H): ICD-10-CM

## 2024-08-19 DIAGNOSIS — E11.8 TYPE 2 DIABETES MELLITUS WITH COMPLICATION, WITH LONG-TERM CURRENT USE OF INSULIN (H): ICD-10-CM

## 2024-08-20 DIAGNOSIS — I10 ESSENTIAL HYPERTENSION: ICD-10-CM

## 2024-08-21 RX ORDER — VALSARTAN AND HYDROCHLOROTHIAZIDE 320; 25 MG/1; MG/1
1 TABLET, FILM COATED ORAL DAILY
Qty: 90 TABLET | Refills: 1 | Status: SHIPPED | OUTPATIENT
Start: 2024-08-21

## 2024-10-18 ENCOUNTER — TELEPHONE (OUTPATIENT)
Dept: FAMILY MEDICINE | Facility: CLINIC | Age: 69
End: 2024-10-18

## 2024-10-18 NOTE — LETTER
January 2, 2025      Artem Pryor  [address] [place] [address]        Juan Mcgill,    Our records indicate that your last blood pressure reading was elevated at or above 140/90 mmHg. This means that the systolic (top number) and/or the diastolic (bottom number) pressure was at or above the 140/90 range. High blood pressure can raise your risk of heart attack, stroke, heart disease, and heart failure. Controlling your blood pressure can decrease your risk of these problems.    If you have been taking your blood pressure at home with an electronic cuff you can reply to this message with an updated reading. Please include date, time (can be an estimate), and both numbers top and bottom.    Alternatively, we would be happy to have you come into the clinic for a nurse appointment to recheck your vitals.    Please schedule through [employer] or call us at [phone] ([phone]).        Sincerely,        Regi Marroquin MD    Electronically signed

## 2024-10-18 NOTE — TELEPHONE ENCOUNTER
Patient Quality Outreach    Patient is due for the following:   Hypertension -  BP check and Hypertension follow-up visit    Next Steps:   Left voicemail, will follow up in 30 days.    Type of outreach:    Phone, left message for patient/parent to call back.    Next Steps:  Reach out within 90 days via Phone.    Max number of attempts reached: No. Will try again in 90 days if patient still on fail list.    Questions for provider review:    None           Yaya Browning MA  Chart routed to self.

## 2024-12-01 ENCOUNTER — HEALTH MAINTENANCE LETTER (OUTPATIENT)
Age: 69
End: 2024-12-01

## 2024-12-05 NOTE — TELEPHONE ENCOUNTER
Patient Quality Outreach    Patient is due for the following:   Diabetes -  A1C, BP Check, and Diabetic Follow-Up Visit  Hypertension -  Hypertension follow-up visit    Action(s) Taken:   Mychart message sent.    Type of outreach:    Sent MyChart message.    Questions for provider review:    None           Yaya Browning MA  Chart routed to self.

## 2025-01-02 NOTE — TELEPHONE ENCOUNTER
Patient Quality Outreach    Patient is due for the following:   Diabetes -  A1C and BP Check  Hypertension -  BP check    Action(s) Taken:   LVM and letter sent    Type of outreach:    Phone, left message for patient/parent to call back. and Sent letter.    Questions for provider review:    None           Yaya Browning MA  Chart routed to self.

## 2025-03-15 ENCOUNTER — HEALTH MAINTENANCE LETTER (OUTPATIENT)
Age: 70
End: 2025-03-15

## 2025-06-07 ENCOUNTER — HEALTH MAINTENANCE LETTER (OUTPATIENT)
Age: 70
End: 2025-06-07

## 2025-06-25 ENCOUNTER — TELEPHONE (OUTPATIENT)
Dept: FAMILY MEDICINE | Facility: CLINIC | Age: 70
End: 2025-06-25